# Patient Record
Sex: FEMALE | Race: WHITE | NOT HISPANIC OR LATINO | URBAN - METROPOLITAN AREA
[De-identification: names, ages, dates, MRNs, and addresses within clinical notes are randomized per-mention and may not be internally consistent; named-entity substitution may affect disease eponyms.]

---

## 2017-10-25 ENCOUNTER — OUTPATIENT (OUTPATIENT)
Dept: OUTPATIENT SERVICES | Facility: HOSPITAL | Age: 32
LOS: 1 days | Discharge: HOME | End: 2017-10-25
Payer: SELF-PAY

## 2017-10-25 DIAGNOSIS — C85.12 UNSPECIFIED B-CELL LYMPHOMA, INTRATHORACIC LYMPH NODES: ICD-10-CM

## 2017-10-30 ENCOUNTER — RESULT REVIEW (OUTPATIENT)
Age: 32
End: 2017-10-30

## 2017-10-30 ENCOUNTER — APPOINTMENT (OUTPATIENT)
Dept: HEMATOLOGY ONCOLOGY | Facility: CLINIC | Age: 32
End: 2017-10-30
Payer: COMMERCIAL

## 2017-10-30 ENCOUNTER — LABORATORY RESULT (OUTPATIENT)
Age: 32
End: 2017-10-30

## 2017-10-30 ENCOUNTER — OUTPATIENT (OUTPATIENT)
Dept: OUTPATIENT SERVICES | Facility: HOSPITAL | Age: 32
LOS: 1 days | End: 2017-10-30
Payer: COMMERCIAL

## 2017-10-30 VITALS
HEART RATE: 78 BPM | WEIGHT: 152.56 LBS | SYSTOLIC BLOOD PRESSURE: 100 MMHG | TEMPERATURE: 97.8 F | OXYGEN SATURATION: 100 % | DIASTOLIC BLOOD PRESSURE: 66 MMHG | BODY MASS INDEX: 26.05 KG/M2 | HEIGHT: 64.37 IN | RESPIRATION RATE: 16 BRPM

## 2017-10-30 DIAGNOSIS — Z78.9 OTHER SPECIFIED HEALTH STATUS: ICD-10-CM

## 2017-10-30 DIAGNOSIS — Z80.3 FAMILY HISTORY OF MALIGNANT NEOPLASM OF BREAST: ICD-10-CM

## 2017-10-30 DIAGNOSIS — C85.12 UNSPECIFIED B-CELL LYMPHOMA, INTRATHORACIC LYMPH NODES: ICD-10-CM

## 2017-10-30 LAB
EOSINOPHIL # BLD AUTO: 0.1 K/UL — SIGNIFICANT CHANGE UP (ref 0–0.5)
EOSINOPHIL NFR BLD AUTO: 1 % — SIGNIFICANT CHANGE UP (ref 0–6)
HCT VFR BLD CALC: 34.6 % — SIGNIFICANT CHANGE UP (ref 34.5–45)
HGB BLD-MCNC: 12.2 G/DL — SIGNIFICANT CHANGE UP (ref 11.5–15.5)
LYMPHOCYTES # BLD AUTO: 53 % — HIGH (ref 13–44)
LYMPHOCYTES # BLD AUTO: 7.3 K/UL — HIGH (ref 1–3.3)
MCHC RBC-ENTMCNC: 28.6 PG — SIGNIFICANT CHANGE UP (ref 27–34)
MCHC RBC-ENTMCNC: 35.4 G/DL — SIGNIFICANT CHANGE UP (ref 32–36)
MCV RBC AUTO: 80.8 FL — SIGNIFICANT CHANGE UP (ref 80–100)
MONOCYTES # BLD AUTO: 0.3 K/UL — SIGNIFICANT CHANGE UP (ref 0–0.9)
MONOCYTES NFR BLD AUTO: 9 % — SIGNIFICANT CHANGE UP (ref 2–14)
NEUTROPHILS # BLD AUTO: 3.5 K/UL — SIGNIFICANT CHANGE UP (ref 1.8–7.4)
NEUTROPHILS NFR BLD AUTO: 37 % — LOW (ref 43–77)
PLAT MORPH BLD: NORMAL — SIGNIFICANT CHANGE UP
PLATELET # BLD AUTO: 156 K/UL — SIGNIFICANT CHANGE UP (ref 150–400)
RBC # BLD: 4.28 M/UL — SIGNIFICANT CHANGE UP (ref 3.8–5.2)
RBC # FLD: 11.5 % — SIGNIFICANT CHANGE UP (ref 10.3–14.5)
RBC BLD AUTO: SIGNIFICANT CHANGE UP
WBC # BLD: 11.3 K/UL — HIGH (ref 3.8–10.5)
WBC # FLD AUTO: 11.3 K/UL — HIGH (ref 3.8–10.5)

## 2017-10-30 PROCEDURE — 88185 FLOWCYTOMETRY/TC ADD-ON: CPT

## 2017-10-30 PROCEDURE — 99244 OFF/OP CNSLTJ NEW/EST MOD 40: CPT

## 2017-10-30 PROCEDURE — 88189 FLOWCYTOMETRY/READ 16 & >: CPT

## 2017-10-30 PROCEDURE — 88184 FLOWCYTOMETRY/ TC 1 MARKER: CPT

## 2017-10-31 DIAGNOSIS — C91.10 CHRONIC LYMPHOCYTIC LEUKEMIA OF B-CELL TYPE NOT HAVING ACHIEVED REMISSION: ICD-10-CM

## 2017-11-01 ENCOUNTER — RESULT REVIEW (OUTPATIENT)
Age: 32
End: 2017-11-01

## 2017-11-01 PROCEDURE — 88321 CONSLTJ&REPRT SLD PREP ELSWR: CPT

## 2017-11-02 LAB — HEMATOPATHOLOGY REPORT: SIGNIFICANT CHANGE UP

## 2017-11-03 LAB — TM INTERPRETATION: SIGNIFICANT CHANGE UP

## 2018-01-02 ENCOUNTER — OUTPATIENT (OUTPATIENT)
Dept: OUTPATIENT SERVICES | Facility: HOSPITAL | Age: 33
LOS: 1 days | Discharge: HOME | End: 2018-01-02

## 2018-01-02 DIAGNOSIS — C91.10 CHRONIC LYMPHOCYTIC LEUKEMIA OF B-CELL TYPE NOT HAVING ACHIEVED REMISSION: ICD-10-CM

## 2018-01-18 ENCOUNTER — FORM ENCOUNTER (OUTPATIENT)
Age: 33
End: 2018-01-18

## 2018-01-19 ENCOUNTER — RESULT REVIEW (OUTPATIENT)
Age: 33
End: 2018-01-19

## 2018-01-19 ENCOUNTER — APPOINTMENT (OUTPATIENT)
Dept: RADIOLOGY | Facility: IMAGING CENTER | Age: 33
End: 2018-01-19
Payer: COMMERCIAL

## 2018-01-19 ENCOUNTER — APPOINTMENT (OUTPATIENT)
Dept: HEMATOLOGY ONCOLOGY | Facility: CLINIC | Age: 33
End: 2018-01-19
Payer: COMMERCIAL

## 2018-01-19 ENCOUNTER — OUTPATIENT (OUTPATIENT)
Dept: OUTPATIENT SERVICES | Facility: HOSPITAL | Age: 33
LOS: 1 days | End: 2018-01-19
Payer: COMMERCIAL

## 2018-01-19 VITALS
RESPIRATION RATE: 16 BRPM | SYSTOLIC BLOOD PRESSURE: 108 MMHG | WEIGHT: 156.53 LBS | TEMPERATURE: 98.6 F | HEART RATE: 72 BPM | DIASTOLIC BLOOD PRESSURE: 72 MMHG | BODY MASS INDEX: 26.56 KG/M2 | OXYGEN SATURATION: 100 %

## 2018-01-19 DIAGNOSIS — C91.10 CHRONIC LYMPHOCYTIC LEUKEMIA OF B-CELL TYPE NOT HAVING ACHIEVED REMISSION: ICD-10-CM

## 2018-01-19 LAB
BASOPHILS # BLD AUTO: 0.2 K/UL — SIGNIFICANT CHANGE UP (ref 0–0.2)
EOSINOPHIL # BLD AUTO: 0.1 K/UL — SIGNIFICANT CHANGE UP (ref 0–0.5)
EOSINOPHIL NFR BLD AUTO: 1 % — SIGNIFICANT CHANGE UP (ref 0–6)
HCT VFR BLD CALC: 32.4 % — LOW (ref 34.5–45)
HGB BLD-MCNC: 11.4 G/DL — LOW (ref 11.5–15.5)
LYMPHOCYTES # BLD AUTO: 57 % — HIGH (ref 13–44)
LYMPHOCYTES # BLD AUTO: 6.5 K/UL — HIGH (ref 1–3.3)
MCHC RBC-ENTMCNC: 28.3 PG — SIGNIFICANT CHANGE UP (ref 27–34)
MCHC RBC-ENTMCNC: 35.2 G/DL — SIGNIFICANT CHANGE UP (ref 32–36)
MCV RBC AUTO: 80.4 FL — SIGNIFICANT CHANGE UP (ref 80–100)
MONOCYTES # BLD AUTO: 0.4 K/UL — SIGNIFICANT CHANGE UP (ref 0–0.9)
MONOCYTES NFR BLD AUTO: 6 % — SIGNIFICANT CHANGE UP (ref 2–14)
NEUTROPHILS # BLD AUTO: 2.7 K/UL — SIGNIFICANT CHANGE UP (ref 1.8–7.4)
NEUTROPHILS NFR BLD AUTO: 36 % — LOW (ref 43–77)
PLAT MORPH BLD: NORMAL — SIGNIFICANT CHANGE UP
PLATELET # BLD AUTO: 159 K/UL — SIGNIFICANT CHANGE UP (ref 150–400)
RBC # BLD: 4.03 M/UL — SIGNIFICANT CHANGE UP (ref 3.8–5.2)
RBC # FLD: 11.4 % — SIGNIFICANT CHANGE UP (ref 10.3–14.5)
RBC BLD AUTO: SIGNIFICANT CHANGE UP
WBC # BLD: 9.9 K/UL — SIGNIFICANT CHANGE UP (ref 3.8–10.5)
WBC # FLD AUTO: 9.9 K/UL — SIGNIFICANT CHANGE UP (ref 3.8–10.5)

## 2018-01-19 PROCEDURE — 99214 OFFICE O/P EST MOD 30 MIN: CPT

## 2018-01-19 PROCEDURE — 71046 X-RAY EXAM CHEST 2 VIEWS: CPT

## 2018-01-19 PROCEDURE — 71046 X-RAY EXAM CHEST 2 VIEWS: CPT | Mod: 26

## 2018-02-12 ENCOUNTER — MEDICATION RENEWAL (OUTPATIENT)
Age: 33
End: 2018-02-12

## 2018-04-30 ENCOUNTER — OUTPATIENT (OUTPATIENT)
Dept: OUTPATIENT SERVICES | Facility: HOSPITAL | Age: 33
LOS: 1 days | Discharge: HOME | End: 2018-04-30

## 2018-04-30 DIAGNOSIS — C91.10 CHRONIC LYMPHOCYTIC LEUKEMIA OF B-CELL TYPE NOT HAVING ACHIEVED REMISSION: ICD-10-CM

## 2018-05-02 ENCOUNTER — RESULT REVIEW (OUTPATIENT)
Age: 33
End: 2018-05-02

## 2018-05-02 ENCOUNTER — APPOINTMENT (OUTPATIENT)
Dept: HEMATOLOGY ONCOLOGY | Facility: CLINIC | Age: 33
End: 2018-05-02
Payer: COMMERCIAL

## 2018-05-02 VITALS
SYSTOLIC BLOOD PRESSURE: 108 MMHG | OXYGEN SATURATION: 100 % | DIASTOLIC BLOOD PRESSURE: 68 MMHG | HEART RATE: 74 BPM | WEIGHT: 151.44 LBS | TEMPERATURE: 97.8 F | RESPIRATION RATE: 16 BRPM | BODY MASS INDEX: 25.7 KG/M2

## 2018-05-02 LAB
BASOPHILS # BLD AUTO: 0 K/UL — SIGNIFICANT CHANGE UP (ref 0–0.2)
EOSINOPHIL # BLD AUTO: 0.1 K/UL — SIGNIFICANT CHANGE UP (ref 0–0.5)
HCT VFR BLD CALC: 37.8 % — SIGNIFICANT CHANGE UP (ref 34.5–45)
HGB BLD-MCNC: 12.9 G/DL — SIGNIFICANT CHANGE UP (ref 11.5–15.5)
LYMPHOCYTES # BLD AUTO: 60 % — HIGH (ref 13–44)
LYMPHOCYTES # BLD AUTO: 7.8 K/UL — HIGH (ref 1–3.3)
MCHC RBC-ENTMCNC: 27.2 PG — SIGNIFICANT CHANGE UP (ref 27–34)
MCHC RBC-ENTMCNC: 34.1 G/DL — SIGNIFICANT CHANGE UP (ref 32–36)
MCV RBC AUTO: 79.9 FL — LOW (ref 80–100)
MONOCYTES # BLD AUTO: 0.5 K/UL — SIGNIFICANT CHANGE UP (ref 0–0.9)
MONOCYTES NFR BLD AUTO: 4 % — SIGNIFICANT CHANGE UP (ref 2–14)
NEUTROPHILS # BLD AUTO: 4.4 K/UL — SIGNIFICANT CHANGE UP (ref 1.8–7.4)
NEUTROPHILS NFR BLD AUTO: 36 % — LOW (ref 43–77)
PLAT MORPH BLD: NORMAL — SIGNIFICANT CHANGE UP
PLATELET # BLD AUTO: 177 K/UL — SIGNIFICANT CHANGE UP (ref 150–400)
RBC # BLD: 4.73 M/UL — SIGNIFICANT CHANGE UP (ref 3.8–5.2)
RBC # FLD: 12 % — SIGNIFICANT CHANGE UP (ref 10.3–14.5)
RBC BLD AUTO: SIGNIFICANT CHANGE UP
WBC # BLD: 12.8 K/UL — HIGH (ref 3.8–10.5)
WBC # FLD AUTO: 12.8 K/UL — HIGH (ref 3.8–10.5)

## 2018-05-02 PROCEDURE — 99213 OFFICE O/P EST LOW 20 MIN: CPT

## 2018-09-07 ENCOUNTER — RESULT REVIEW (OUTPATIENT)
Age: 33
End: 2018-09-07

## 2018-11-05 ENCOUNTER — OUTPATIENT (OUTPATIENT)
Dept: OUTPATIENT SERVICES | Facility: HOSPITAL | Age: 33
LOS: 1 days | Discharge: HOME | End: 2018-11-05

## 2018-11-05 DIAGNOSIS — C91.10 CHRONIC LYMPHOCYTIC LEUKEMIA OF B-CELL TYPE NOT HAVING ACHIEVED REMISSION: ICD-10-CM

## 2018-11-14 ENCOUNTER — RESULT REVIEW (OUTPATIENT)
Age: 33
End: 2018-11-14

## 2018-11-14 ENCOUNTER — APPOINTMENT (OUTPATIENT)
Dept: HEMATOLOGY ONCOLOGY | Facility: CLINIC | Age: 33
End: 2018-11-14
Payer: COMMERCIAL

## 2018-11-14 VITALS
WEIGHT: 158.29 LBS | RESPIRATION RATE: 16 BRPM | BODY MASS INDEX: 26.86 KG/M2 | TEMPERATURE: 98.1 F | OXYGEN SATURATION: 100 % | HEART RATE: 69 BPM | SYSTOLIC BLOOD PRESSURE: 125 MMHG | DIASTOLIC BLOOD PRESSURE: 75 MMHG

## 2018-11-14 LAB
BASOPHILS # BLD AUTO: 0 K/UL — SIGNIFICANT CHANGE UP (ref 0–0.2)
EOSINOPHIL # BLD AUTO: 0.1 K/UL — SIGNIFICANT CHANGE UP (ref 0–0.5)
EOSINOPHIL NFR BLD AUTO: 1 % — SIGNIFICANT CHANGE UP (ref 0–6)
HCT VFR BLD CALC: 36.6 % — SIGNIFICANT CHANGE UP (ref 34.5–45)
HGB BLD-MCNC: 12.2 G/DL — SIGNIFICANT CHANGE UP (ref 11.5–15.5)
LYMPHOCYTES # BLD AUTO: 10.7 K/UL — HIGH (ref 1–3.3)
LYMPHOCYTES # BLD AUTO: 79 % — HIGH (ref 13–44)
MCHC RBC-ENTMCNC: 26.5 PG — LOW (ref 27–34)
MCHC RBC-ENTMCNC: 33.4 G/DL — SIGNIFICANT CHANGE UP (ref 32–36)
MCV RBC AUTO: 79.5 FL — LOW (ref 80–100)
MONOCYTES # BLD AUTO: 0.5 K/UL — SIGNIFICANT CHANGE UP (ref 0–0.9)
MONOCYTES NFR BLD AUTO: 1 % — LOW (ref 2–14)
NEUTROPHILS # BLD AUTO: 4.9 K/UL — SIGNIFICANT CHANGE UP (ref 1.8–7.4)
NEUTROPHILS NFR BLD AUTO: 19 % — LOW (ref 43–77)
PLAT MORPH BLD: NORMAL — SIGNIFICANT CHANGE UP
PLATELET # BLD AUTO: 186 K/UL — SIGNIFICANT CHANGE UP (ref 150–400)
RBC # BLD: 4.61 M/UL — SIGNIFICANT CHANGE UP (ref 3.8–5.2)
RBC # FLD: 11.5 % — SIGNIFICANT CHANGE UP (ref 10.3–14.5)
RBC BLD AUTO: SIGNIFICANT CHANGE UP
SMUDGE CELLS # BLD: PRESENT — SIGNIFICANT CHANGE UP
WBC # BLD: 16.2 K/UL — HIGH (ref 3.8–10.5)
WBC # FLD AUTO: 16.2 K/UL — HIGH (ref 3.8–10.5)

## 2018-11-14 PROCEDURE — 99213 OFFICE O/P EST LOW 20 MIN: CPT

## 2018-11-14 RX ORDER — LEVOFLOXACIN 500 MG/1
500 TABLET, FILM COATED ORAL DAILY
Qty: 10 | Refills: 0 | Status: DISCONTINUED | COMMUNITY
Start: 2018-01-19 | End: 2018-11-14

## 2018-11-14 RX ORDER — AMOXICILLIN AND CLAVULANATE POTASSIUM 875; 125 MG/1; MG/1
875-125 TABLET, COATED ORAL
Qty: 14 | Refills: 1 | Status: DISCONTINUED | COMMUNITY
Start: 2018-02-12 | End: 2018-11-14

## 2018-11-14 RX ORDER — BENZONATATE 100 MG/1
100 CAPSULE ORAL 3 TIMES DAILY
Qty: 30 | Refills: 0 | Status: DISCONTINUED | COMMUNITY
Start: 2018-01-19 | End: 2018-11-14

## 2018-11-14 RX ORDER — HYDROCODONE BITARTRATE AND HOMATROPINE METHYLBROMIDE 5; 1.5 MG/5ML; MG/5ML
5-1.5 SYRUP ORAL
Qty: 1 | Refills: 0 | Status: DISCONTINUED | COMMUNITY
Start: 2018-02-12 | End: 2018-11-14

## 2018-11-14 NOTE — ASSESSMENT
[FreeTextEntry1] : This is a 33 year old female with lymphocytosis, bone marrow biopsy is consistent with chronic lymphocytic leukemia- stage 0.\par PET scan 9/2017 did not show any lymphadenopathy or splenomegaly.  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 \par Her CBC is stable and she has no evidence of splenomegaly or lymphadenopathy.  \par Check her CMP, LDH.\par Recommend surgical evaluation for a potential cholecystectomy.    \par Follow up every 6 months.

## 2018-11-14 NOTE — HISTORY OF PRESENT ILLNESS
[de-identified] : CLL- stage 0.  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 \par \par She was found to have elevated white blood cell count on routine blood work.  CBC on 7/1- WBC 11.2 ALC 7750 Hg 12.0 Plt 192.  She was seen by Dr. Subha Charles.   Peripheral blood flow cytometry was sent 8/24/17- revealed a population of B cells, positive for CD19, CD20,negative for CD5 and CD10 with no light chain restriction seen.  Labs sent reveal a normal SPEP/LAINE, k/l ration 1.71, B2 microglobulin 1.41, IgG 889, IgM 87,ESR<2.  She underwent a bone marrow biopsy on 9/26 which revealed a normocellular marrow, trilineage hematopoiesis, minimal involvement with CLL (10-15%)- IHC positive CD19, CD20, CD5, CD23.  CLL FISH revealed deletion 13, no evidence of del 11 or 17.  IGVH is mutated, CD38 is not expressed.  For some reason FISH for bcr-abl was sent- negative along with CALR which was also normal.  She had a PET/CT completed on 9/7 that revealed a 5 mm nodule in her LLL, otherwise no lymphadenopathy or splenomegaly.  She denies any previous medical history. \par \par Labs from 7/31/17 show Monos 0.8, EOS 0.4%, Lymph 69.4%, Hgb 12.0, Polys 29.0, HCT 38.1, WBC 11.2, Plt 192K. Labs from 6/19/16 show Monos 3.5, EOS 0.5%, Lymph 56.7%, Hgb 12.0, Polys 38.9, HCT 36.2, WBC 11.3, Plt 192K, Cr 0.7. \par \par \par \par \par \par  [de-identified] : Patient presents for a follow up appointment. She is doing well, denies any new infections.  She has been contemplating having a cholecystectomy due to large gallstones, she is not symptomatic but a recent ultrasound completed 2 months ago revealed large stones.  She has been dealing with gallstones for the last 8 years since the birth of her son.  She denies any fatigue, fever/chills, no night sweats, no weight loss.  She denies any chest pain, no SOB, no abdominal c/o, no n/v/d.\par

## 2018-11-14 NOTE — ADDENDUM
[FreeTextEntry1] :  Documented by Beatriz Chang acting as a scribe for Dr. Franco on 11/14/18.\par \par All medical record entries made by the Scribe were at my, Dr. Franco's, direction and\par personally dictated by me on 11/14/18 I have reviewed the chart and agree that the record\par accurately reflects my personal performance of the history, physical exam, procedure and imaging.

## 2019-03-14 LAB
ALBUMIN SERPL ELPH-MCNC: 4.5 G/DL
ALBUMIN SERPL ELPH-MCNC: 4.8 G/DL
ALBUMIN SERPL ELPH-MCNC: 4.9 G/DL
ALP BLD-CCNC: 41 U/L
ALP BLD-CCNC: 42 U/L
ALP BLD-CCNC: 42 U/L
ALT SERPL-CCNC: 20 U/L
ALT SERPL-CCNC: 23 U/L
ALT SERPL-CCNC: 28 U/L
ANION GAP SERPL CALC-SCNC: 12 MMOL/L
ANION GAP SERPL CALC-SCNC: 13 MMOL/L
ANION GAP SERPL CALC-SCNC: 15 MMOL/L
AST SERPL-CCNC: 13 U/L
AST SERPL-CCNC: 18 U/L
AST SERPL-CCNC: 19 U/L
BILIRUB SERPL-MCNC: 0.2 MG/DL
BILIRUB SERPL-MCNC: 0.2 MG/DL
BILIRUB SERPL-MCNC: <0.2 MG/DL
BUN SERPL-MCNC: 11 MG/DL
BUN SERPL-MCNC: 11 MG/DL
BUN SERPL-MCNC: 15 MG/DL
CALCIUM SERPL-MCNC: 10.2 MG/DL
CALCIUM SERPL-MCNC: 9.3 MG/DL
CALCIUM SERPL-MCNC: 9.8 MG/DL
CHLORIDE SERPL-SCNC: 101 MMOL/L
CHLORIDE SERPL-SCNC: 103 MMOL/L
CHLORIDE SERPL-SCNC: 103 MMOL/L
CO2 SERPL-SCNC: 25 MMOL/L
CO2 SERPL-SCNC: 25 MMOL/L
CO2 SERPL-SCNC: 26 MMOL/L
CREAT SERPL-MCNC: 0.64 MG/DL
CREAT SERPL-MCNC: 0.77 MG/DL
CREAT SERPL-MCNC: 0.79 MG/DL
GLUCOSE SERPL-MCNC: 100 MG/DL
GLUCOSE SERPL-MCNC: 106 MG/DL
GLUCOSE SERPL-MCNC: 87 MG/DL
LDH SERPL-CCNC: 130 U/L
LDH SERPL-CCNC: 132 U/L
LDH SERPL-CCNC: 146 U/L
POTASSIUM SERPL-SCNC: 4.1 MMOL/L
POTASSIUM SERPL-SCNC: 4.3 MMOL/L
POTASSIUM SERPL-SCNC: 4.8 MMOL/L
PROT SERPL-MCNC: 6.9 G/DL
PROT SERPL-MCNC: 7.1 G/DL
PROT SERPL-MCNC: 7.4 G/DL
SODIUM SERPL-SCNC: 139 MMOL/L
SODIUM SERPL-SCNC: 141 MMOL/L
SODIUM SERPL-SCNC: 143 MMOL/L

## 2019-04-04 ENCOUNTER — OUTPATIENT (OUTPATIENT)
Dept: OUTPATIENT SERVICES | Facility: HOSPITAL | Age: 34
LOS: 1 days | Discharge: HOME | End: 2019-04-04

## 2019-04-04 DIAGNOSIS — C91.10 CHRONIC LYMPHOCYTIC LEUKEMIA OF B-CELL TYPE NOT HAVING ACHIEVED REMISSION: ICD-10-CM

## 2019-04-12 ENCOUNTER — RESULT REVIEW (OUTPATIENT)
Age: 34
End: 2019-04-12

## 2019-04-12 ENCOUNTER — APPOINTMENT (OUTPATIENT)
Dept: HEMATOLOGY ONCOLOGY | Facility: CLINIC | Age: 34
End: 2019-04-12
Payer: COMMERCIAL

## 2019-04-12 VITALS
HEART RATE: 69 BPM | BODY MASS INDEX: 26.19 KG/M2 | WEIGHT: 154.32 LBS | TEMPERATURE: 98.2 F | OXYGEN SATURATION: 100 % | DIASTOLIC BLOOD PRESSURE: 73 MMHG | RESPIRATION RATE: 16 BRPM | SYSTOLIC BLOOD PRESSURE: 115 MMHG

## 2019-04-12 DIAGNOSIS — M79.603 PAIN IN ARM, UNSPECIFIED: ICD-10-CM

## 2019-04-12 LAB
BASOPHILS # BLD AUTO: 0.1 K/UL — SIGNIFICANT CHANGE UP (ref 0–0.2)
EOSINOPHIL # BLD AUTO: 0.1 K/UL — SIGNIFICANT CHANGE UP (ref 0–0.5)
HCT VFR BLD CALC: 35.2 % — SIGNIFICANT CHANGE UP (ref 34.5–45)
HGB BLD-MCNC: 11.7 G/DL — SIGNIFICANT CHANGE UP (ref 11.5–15.5)
LYMPHOCYTES # BLD AUTO: 70 % — HIGH (ref 13–44)
LYMPHOCYTES # BLD AUTO: 9.7 K/UL — HIGH (ref 1–3.3)
MCHC RBC-ENTMCNC: 26.8 PG — LOW (ref 27–34)
MCHC RBC-ENTMCNC: 33.4 G/DL — SIGNIFICANT CHANGE UP (ref 32–36)
MCV RBC AUTO: 80.2 FL — SIGNIFICANT CHANGE UP (ref 80–100)
MONOCYTES # BLD AUTO: 0.4 K/UL — SIGNIFICANT CHANGE UP (ref 0–0.9)
MONOCYTES NFR BLD AUTO: 4 % — SIGNIFICANT CHANGE UP (ref 2–14)
NEUTROPHILS # BLD AUTO: 4.4 K/UL — SIGNIFICANT CHANGE UP (ref 1.8–7.4)
NEUTROPHILS NFR BLD AUTO: 26 % — LOW (ref 43–77)
PLAT MORPH BLD: NORMAL — SIGNIFICANT CHANGE UP
PLATELET # BLD AUTO: 182 K/UL — SIGNIFICANT CHANGE UP (ref 150–400)
RBC # BLD: 4.38 M/UL — SIGNIFICANT CHANGE UP (ref 3.8–5.2)
RBC # FLD: 11.4 % — SIGNIFICANT CHANGE UP (ref 10.3–14.5)
RBC BLD AUTO: SIGNIFICANT CHANGE UP
WBC # BLD: 14.7 K/UL — HIGH (ref 3.8–10.5)
WBC # FLD AUTO: 14.7 K/UL — HIGH (ref 3.8–10.5)

## 2019-04-12 PROCEDURE — 99214 OFFICE O/P EST MOD 30 MIN: CPT

## 2019-04-14 ENCOUNTER — FORM ENCOUNTER (OUTPATIENT)
Age: 34
End: 2019-04-14

## 2019-04-15 ENCOUNTER — APPOINTMENT (OUTPATIENT)
Dept: RADIOLOGY | Facility: IMAGING CENTER | Age: 34
End: 2019-04-15
Payer: COMMERCIAL

## 2019-04-15 ENCOUNTER — OUTPATIENT (OUTPATIENT)
Dept: OUTPATIENT SERVICES | Facility: HOSPITAL | Age: 34
LOS: 1 days | End: 2019-04-15
Payer: COMMERCIAL

## 2019-04-15 ENCOUNTER — APPOINTMENT (OUTPATIENT)
Dept: RADIOLOGY | Facility: IMAGING CENTER | Age: 34
End: 2019-04-15

## 2019-04-15 ENCOUNTER — FORM ENCOUNTER (OUTPATIENT)
Age: 34
End: 2019-04-15

## 2019-04-15 DIAGNOSIS — M79.603 PAIN IN ARM, UNSPECIFIED: ICD-10-CM

## 2019-04-15 PROCEDURE — 73080 X-RAY EXAM OF ELBOW: CPT

## 2019-04-15 PROCEDURE — 73110 X-RAY EXAM OF WRIST: CPT | Mod: 26,RT

## 2019-04-15 PROCEDURE — 73030 X-RAY EXAM OF SHOULDER: CPT

## 2019-04-15 PROCEDURE — 73060 X-RAY EXAM OF HUMERUS: CPT

## 2019-04-15 PROCEDURE — 73110 X-RAY EXAM OF WRIST: CPT

## 2019-04-17 ENCOUNTER — MESSAGE (OUTPATIENT)
Age: 34
End: 2019-04-17

## 2019-05-20 ENCOUNTER — APPOINTMENT (OUTPATIENT)
Dept: OTOLARYNGOLOGY | Facility: CLINIC | Age: 34
End: 2019-05-20

## 2019-06-03 NOTE — HISTORY OF PRESENT ILLNESS
[de-identified] : CLL- stage 0.  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 \par \par She was found to have elevated white blood cell count on routine blood work.  CBC on 7/1- WBC 11.2 ALC 7750 Hg 12.0 Plt 192.  She was seen by Dr. Subha Chalres.   Peripheral blood flow cytometry was sent 8/24/17- revealed a population of B cells, positive for CD19, CD20,negative for CD5 and CD10 with no light chain restriction seen.  Labs sent reveal a normal SPEP/LAINE, k/l ration 1.71, B2 microglobulin 1.41, IgG 889, IgM 87,ESR<2.  She underwent a bone marrow biopsy on 9/26 which revealed a normocellular marrow, trilineage hematopoiesis, minimal involvement with CLL (10-15%)- IHC positive CD19, CD20, CD5, CD23.  CLL FISH revealed deletion 13, no evidence of del 11 or 17.  IGVH is mutated, CD38 is not expressed.  For some reason FISH for bcr-abl was sent- negative along with CALR which was also normal.  She had a PET/CT completed on 9/7 that revealed a 5 mm nodule in her LLL, otherwise no lymphadenopathy or splenomegaly.  She denies any previous medical history. \par \par Labs from 7/31/17 show Monos 0.8, EOS 0.4%, Lymph 69.4%, Hgb 12.0, Polys 29.0, HCT 38.1, WBC 11.2, Plt 192K. Labs from 6/19/16 show Monos 3.5, EOS 0.5%, Lymph 56.7%, Hgb 12.0, Polys 38.9, HCT 36.2, WBC 11.3, Plt 192K, Cr 0.7. \par \par \par \par \par \par  [de-identified] : Patient presents for a follow up appointment. She is doing well but reports fatigue and a shooting pain in her right arm - has trouble grabbing things. States that’s it started about 5 months ago. She continues to complain of a cough, clear phlegm and was given Zpack by her PCP.  She denies any fatigue, no fever/chills, no abdominal pain, no night sweats, no weight loss, no n/v/d. \par

## 2019-06-03 NOTE — PHYSICAL EXAM
[Fully active, able to carry on all pre-disease performance without restriction] : Status 0 - Fully active, able to carry on all pre-disease performance without restriction [Normal] : affect appropriate [de-identified] : Full ROM in UE

## 2019-06-03 NOTE — ASSESSMENT
[FreeTextEntry1] : This is a 33 year old female with lymphocytosis, bone marrow biopsy is consistent with chronic lymphocytic leukemia- stage 0.\par PET scan 9/2017 did not show any lymphadenopathy or splenomegaly.  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 \par Her CBC is stable and she has no evidence of splenomegaly or lymphadenopathy.  \par Check her CMP, LDH, TSH\par Check iron studies, begin slow Fe every other day.  Bowel regimen as needed.\par Plan for X-Ray of right shoulder/elbow\par She will follow up with her PCP and will call if her cough does not improve.\par Follow up in 3-4 months.

## 2019-06-03 NOTE — ADDENDUM
[FreeTextEntry1] : Documented by Beatriz Chang acting as a scribe for Dr. Franco on 4/12/2019\par \par All medical record entries made by the Scribe were at my, Dr. Franco's, direction and\par personally dictated by me on 4/12/2019 I have reviewed the chart and agree that the record\par accurately reflects my personal performance of the history, physical exam, procedure and imaging.

## 2019-06-04 ENCOUNTER — APPOINTMENT (OUTPATIENT)
Dept: PULMONOLOGY | Facility: CLINIC | Age: 34
End: 2019-06-04
Payer: COMMERCIAL

## 2019-06-04 VITALS
OXYGEN SATURATION: 98 % | HEART RATE: 87 BPM | RESPIRATION RATE: 15 BRPM | DIASTOLIC BLOOD PRESSURE: 80 MMHG | TEMPERATURE: 98.6 F | WEIGHT: 149 LBS | SYSTOLIC BLOOD PRESSURE: 128 MMHG | HEIGHT: 64 IN | BODY MASS INDEX: 25.44 KG/M2

## 2019-06-04 DIAGNOSIS — R05 COUGH: ICD-10-CM

## 2019-06-04 PROCEDURE — 99203 OFFICE O/P NEW LOW 30 MIN: CPT

## 2019-06-04 NOTE — CONSULT LETTER
[Dear  ___] : Dear  [unfilled], [Consult Letter:] : I had the pleasure of evaluating your patient, [unfilled]. [FreeTextEntry2] : Bethany Michael MD

## 2019-06-04 NOTE — REVIEW OF SYSTEMS
[Cough] : cough [Headache] : headache [Negative] : Dermatologic [Dry Eyes] : no dryness of the eyes [Nasal Congestion] : no nasal congestion [Eye Irritation] : no ~T irritation of the eyes [Ear Disturbance] : no ear disturbance [Epistaxis] : no nosebleeds [Sore Throat] : no sore throat [Postnasal Drip] : no postnasal drip [Hemoptysis] : no hemoptysis [Sputum] : not coughing up ~M sputum [Dry Mouth] : no dry mouth [Chest Tightness] : no chest tightness [Dyspnea] : no dyspnea [Pleuritic Pain] : no pleuritic pain [Frequent URIs] : no frequent upper respiratory infections [Wheezing] : no wheezing

## 2019-06-04 NOTE — DISCUSSION/SUMMARY
[FreeTextEntry1] : 34 yo woman with CLL, no on treatment presenting with 2 months of dry cough, starting with URI but has not resolved, treated with Z pack and benadryl with no improvement.  \par \par Cough: likely 2/2 upper airway cough syndrome.  Will start Flonase BID for 4 week, if not improving in 2 weeks, will schedule PFTs and Repeat Chest CT, though underlying airway or lung disease is less likely at this point. \par \par

## 2019-06-04 NOTE — PHYSICAL EXAM
[General Appearance - Well Developed] : well developed [General Appearance - Well Nourished] : well nourished [Well Groomed] : well groomed [Normal Appearance] : normal appearance [Normal Conjunctiva] : the conjunctiva exhibited no abnormalities [General Appearance - In No Acute Distress] : no acute distress [No Deformities] : no deformities [Normal Oropharynx] : normal oropharynx [Eyelids - No Xanthelasma] : the eyelids demonstrated no xanthelasmas [Jugular Venous Distention Increased] : there was no jugular-venous distention [Neck Cervical Mass (___cm)] : no neck mass was observed [Neck Appearance] : the appearance of the neck was normal [Thyroid Nodule] : there were no palpable thyroid nodules [Thyroid Diffuse Enlargement] : the thyroid was not enlarged [Murmurs] : no murmurs present [Heart Sounds] : normal S1 and S2 [Heart Rate And Rhythm] : heart rate and rhythm were normal [Exaggerated Use Of Accessory Muscles For Inspiration] : no accessory muscle use [Respiration, Rhythm And Depth] : normal respiratory rhythm and effort [Abdomen Soft] : soft [Auscultation Breath Sounds / Voice Sounds] : lungs were clear to auscultation bilaterally [Abdomen Mass (___ Cm)] : no abdominal mass palpated [Abdomen Tenderness] : non-tender [Abnormal Walk] : normal gait [Nail Clubbing] : no clubbing of the fingernails [Gait - Sufficient For Exercise Testing] : the gait was sufficient for exercise testing [Cyanosis, Localized] : no localized cyanosis [Petechial Hemorrhages (___cm)] : no petechial hemorrhages [Skin Color & Pigmentation] : normal skin color and pigmentation [] : no rash [Skin Turgor] : normal skin turgor [Sensation] : the sensory exam was normal to light touch and pinprick [No Focal Deficits] : no focal deficits [Deep Tendon Reflexes (DTR)] : deep tendon reflexes were 2+ and symmetric [Oriented To Time, Place, And Person] : oriented to person, place, and time [Impaired Insight] : insight and judgment were intact [Affect] : the affect was normal

## 2019-06-04 NOTE — HISTORY OF PRESENT ILLNESS
[FreeTextEntry1] : 34 yo woman with a cough of 2 month, started with itchy throat, now dry cough, tickle in the back of the throat, no fevers, no chills, coughing fits with headache.  Tried benadryl, zithromax, with maybe 1 day relief. \par Cough more during the day. No GERD, NO sour taste, no throat clearing, no sick contacts.  NO weight loss, night sweats or fatigue. Chronic had and pedal swelling. 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 \par \par She reports a recent chest Xray was negative\par meds------ none\par imaging----- PET with 5mm LL nodule in 2017\par

## 2019-06-19 ENCOUNTER — OUTPATIENT (OUTPATIENT)
Dept: OUTPATIENT SERVICES | Facility: HOSPITAL | Age: 34
LOS: 1 days | Discharge: HOME | End: 2019-06-19

## 2019-06-19 DIAGNOSIS — C91.10 CHRONIC LYMPHOCYTIC LEUKEMIA OF B-CELL TYPE NOT HAVING ACHIEVED REMISSION: ICD-10-CM

## 2019-06-20 ENCOUNTER — RESULT REVIEW (OUTPATIENT)
Age: 34
End: 2019-06-20

## 2019-06-20 ENCOUNTER — APPOINTMENT (OUTPATIENT)
Dept: HEMATOLOGY ONCOLOGY | Facility: CLINIC | Age: 34
End: 2019-06-20
Payer: COMMERCIAL

## 2019-06-20 VITALS
RESPIRATION RATE: 16 BRPM | OXYGEN SATURATION: 100 % | SYSTOLIC BLOOD PRESSURE: 120 MMHG | TEMPERATURE: 99.2 F | WEIGHT: 148 LBS | HEART RATE: 80 BPM | DIASTOLIC BLOOD PRESSURE: 69 MMHG | BODY MASS INDEX: 25.41 KG/M2

## 2019-06-20 LAB
BASOPHILS # BLD AUTO: 0.1 K/UL — SIGNIFICANT CHANGE UP (ref 0–0.2)
EOSINOPHIL # BLD AUTO: 0.1 K/UL — SIGNIFICANT CHANGE UP (ref 0–0.5)
HCT VFR BLD CALC: 35.2 % — SIGNIFICANT CHANGE UP (ref 34.5–45)
HGB BLD-MCNC: 12.1 G/DL — SIGNIFICANT CHANGE UP (ref 11.5–15.5)
LYMPHOCYTES # BLD AUTO: 11.6 K/UL — HIGH (ref 1–3.3)
LYMPHOCYTES # BLD AUTO: 63 % — HIGH (ref 13–44)
MCHC RBC-ENTMCNC: 27.4 PG — SIGNIFICANT CHANGE UP (ref 27–34)
MCHC RBC-ENTMCNC: 34.3 G/DL — SIGNIFICANT CHANGE UP (ref 32–36)
MCV RBC AUTO: 79.9 FL — LOW (ref 80–100)
MONOCYTES # BLD AUTO: 0.5 K/UL — SIGNIFICANT CHANGE UP (ref 0–0.9)
MONOCYTES NFR BLD AUTO: 5 % — SIGNIFICANT CHANGE UP (ref 2–14)
NEUTROPHILS # BLD AUTO: 4.2 K/UL — SIGNIFICANT CHANGE UP (ref 1.8–7.4)
NEUTROPHILS NFR BLD AUTO: 32 % — LOW (ref 43–77)
PLAT MORPH BLD: NORMAL — SIGNIFICANT CHANGE UP
PLATELET # BLD AUTO: 175 K/UL — SIGNIFICANT CHANGE UP (ref 150–400)
RBC # BLD: 4.4 M/UL — SIGNIFICANT CHANGE UP (ref 3.8–5.2)
RBC # FLD: 12 % — SIGNIFICANT CHANGE UP (ref 10.3–14.5)
RBC BLD AUTO: SIGNIFICANT CHANGE UP
WBC # BLD: 16.5 K/UL — HIGH (ref 3.8–10.5)
WBC # FLD AUTO: 16.5 K/UL — HIGH (ref 3.8–10.5)

## 2019-06-20 PROCEDURE — 99214 OFFICE O/P EST MOD 30 MIN: CPT

## 2019-06-20 NOTE — REVIEW OF SYSTEMS
[Fever] : no fever [Chills] : chills [Night Sweats] : night sweats [Cough] : cough [Muscle Pain] : no muscle pain [Negative] : Musculoskeletal [FreeTextEntry2] : chills and sweats for 2 weeks-day and night  [FreeTextEntry6] : chronic cough for 5 months [de-identified] : swollen R neck area, not palpable

## 2019-06-20 NOTE — ASSESSMENT
[FreeTextEntry1] : This is a 33 year old female with lymphocytosis, bone marrow biopsy is consistent with chronic lymphocytic leukemia- stage 0.\par PET scan 9/2017 did not show any lymphadenopathy or splenomegaly.  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 \par Her CBC is stable and she has no evidence of splenomegaly or lymphadenopathy.  \par Right neck fullness, swelling last few days-saw PCP who recommends CT of chest and neck. Advised to do testing with contrast.\par Taking slow Fe every other day.  Bowel regimen as needed.\par Chronic cough-will see pulmonary on 6/24.\par Follow up in 3-4 months.

## 2019-06-20 NOTE — REASON FOR VISIT
[Follow-Up Visit] : a follow-up visit for [CLL] : chronic lymphocytic leukemia [Parent] : parent [FreeTextEntry2] : Lymphocytosis

## 2019-06-20 NOTE — HISTORY OF PRESENT ILLNESS
[de-identified] : CLL- stage 0.  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 \par \par She was found to have elevated white blood cell count on routine blood work.  CBC on 7/1- WBC 11.2 ALC 7750 Hg 12.0 Plt 192.  She was seen by Dr. Subha Charles.   Peripheral blood flow cytometry was sent 8/24/17- revealed a population of B cells, positive for CD19, CD20,negative for CD5 and CD10 with no light chain restriction seen.  Labs sent reveal a normal SPEP/LAINE, k/l ration 1.71, B2 microglobulin 1.41, IgG 889, IgM 87,ESR<2.  She underwent a bone marrow biopsy on 9/26 which revealed a normocellular marrow, trilineage hematopoiesis, minimal involvement with CLL (10-15%)- IHC positive CD19, CD20, CD5, CD23.  CLL FISH revealed deletion 13, no evidence of del 11 or 17.  IGVH is mutated, CD38 is not expressed.  For some reason FISH for bcr-abl was sent- negative along with CALR which was also normal.  She had a PET/CT completed on 9/7 that revealed a 5 mm nodule in her LLL, otherwise no lymphadenopathy or splenomegaly.  She denies any previous medical history. \par \par Labs from 7/31/17 show Monos 0.8, EOS 0.4%, Lymph 69.4%, Hgb 12.0, Polys 29.0, HCT 38.1, WBC 11.2, Plt 192K. Labs from 6/19/16 show Monos 3.5, EOS 0.5%, Lymph 56.7%, Hgb 12.0, Polys 38.9, HCT 36.2, WBC 11.3, Plt 192K, Cr 0.7. \par \par \par \par \par \par  [de-identified] : Patient presents for a follow up appointment. She is doing well but reports fatigue and a shooting pain in her right arm - has trouble grabbing things. States that’s it started about 5 months ago. She continues to complain of a cough, clear phlegm and was given Zpack by her PCP.  She denies any fatigue, no fever/chills, no abdominal pain, no night sweats, no weight loss, no n/v/d. \par \par Pt presents today with c/o R neck swelling, fullness that was noted by PCP Dr Bethany Michael on Monday, also notes 2 week hx of sweats-these occur day and night.  No fevers.  Pt has had chronic cough for 5 months-has received antibiotics; is seeing pulmonologist on 6/24.\par

## 2019-06-20 NOTE — PHYSICAL EXAM
[Fully active, able to carry on all pre-disease performance without restriction] : Status 0 - Fully active, able to carry on all pre-disease performance without restriction [Normal] : affect appropriate [de-identified] : Full ROM in UE

## 2019-06-24 ENCOUNTER — APPOINTMENT (OUTPATIENT)
Dept: PULMONOLOGY | Facility: CLINIC | Age: 34
End: 2019-06-24

## 2019-07-03 ENCOUNTER — APPOINTMENT (OUTPATIENT)
Dept: CT IMAGING | Facility: IMAGING CENTER | Age: 34
End: 2019-07-03
Payer: COMMERCIAL

## 2019-07-03 ENCOUNTER — OUTPATIENT (OUTPATIENT)
Dept: OUTPATIENT SERVICES | Facility: HOSPITAL | Age: 34
LOS: 1 days | End: 2019-07-03
Payer: COMMERCIAL

## 2019-07-03 DIAGNOSIS — C91.12 CHRONIC LYMPHOCYTIC LEUKEMIA OF B-CELL TYPE IN RELAPSE: ICD-10-CM

## 2019-07-03 PROCEDURE — 70491 CT SOFT TISSUE NECK W/DYE: CPT | Mod: 26

## 2019-07-03 PROCEDURE — 71260 CT THORAX DX C+: CPT

## 2019-07-03 PROCEDURE — 71260 CT THORAX DX C+: CPT | Mod: 26

## 2019-07-03 PROCEDURE — 70491 CT SOFT TISSUE NECK W/DYE: CPT

## 2019-07-18 ENCOUNTER — OUTPATIENT (OUTPATIENT)
Dept: OUTPATIENT SERVICES | Facility: HOSPITAL | Age: 34
LOS: 1 days | Discharge: HOME | End: 2019-07-18

## 2019-07-18 DIAGNOSIS — C91.10 CHRONIC LYMPHOCYTIC LEUKEMIA OF B-CELL TYPE NOT HAVING ACHIEVED REMISSION: ICD-10-CM

## 2019-07-22 ENCOUNTER — RESULT REVIEW (OUTPATIENT)
Age: 34
End: 2019-07-22

## 2019-07-22 ENCOUNTER — APPOINTMENT (OUTPATIENT)
Dept: HEMATOLOGY ONCOLOGY | Facility: CLINIC | Age: 34
End: 2019-07-22
Payer: COMMERCIAL

## 2019-07-22 VITALS
OXYGEN SATURATION: 100 % | BODY MASS INDEX: 26.11 KG/M2 | TEMPERATURE: 98.7 F | DIASTOLIC BLOOD PRESSURE: 71 MMHG | WEIGHT: 152.12 LBS | HEART RATE: 72 BPM | SYSTOLIC BLOOD PRESSURE: 117 MMHG | RESPIRATION RATE: 16 BRPM

## 2019-07-22 LAB
BASOPHILS # BLD AUTO: 0.1 K/UL — SIGNIFICANT CHANGE UP (ref 0–0.2)
EOSINOPHIL # BLD AUTO: 0.1 K/UL — SIGNIFICANT CHANGE UP (ref 0–0.5)
EOSINOPHIL NFR BLD AUTO: 2 % — SIGNIFICANT CHANGE UP (ref 0–6)
HCT VFR BLD CALC: 35.1 % — SIGNIFICANT CHANGE UP (ref 34.5–45)
HGB BLD-MCNC: 11.9 G/DL — SIGNIFICANT CHANGE UP (ref 11.5–15.5)
LYMPHOCYTES # BLD AUTO: 10.5 K/UL — HIGH (ref 1–3.3)
LYMPHOCYTES # BLD AUTO: 59 % — HIGH (ref 13–44)
MCHC RBC-ENTMCNC: 27.5 PG — SIGNIFICANT CHANGE UP (ref 27–34)
MCHC RBC-ENTMCNC: 33.9 G/DL — SIGNIFICANT CHANGE UP (ref 32–36)
MCV RBC AUTO: 81 FL — SIGNIFICANT CHANGE UP (ref 80–100)
MONOCYTES # BLD AUTO: 0.4 K/UL — SIGNIFICANT CHANGE UP (ref 0–0.9)
MONOCYTES NFR BLD AUTO: 6 % — SIGNIFICANT CHANGE UP (ref 2–14)
NEUTROPHILS # BLD AUTO: 3.6 K/UL — SIGNIFICANT CHANGE UP (ref 1.8–7.4)
NEUTROPHILS NFR BLD AUTO: 33 % — LOW (ref 43–77)
PLAT MORPH BLD: NORMAL — SIGNIFICANT CHANGE UP
PLATELET # BLD AUTO: 157 K/UL — SIGNIFICANT CHANGE UP (ref 150–400)
RBC # BLD: 4.33 M/UL — SIGNIFICANT CHANGE UP (ref 3.8–5.2)
RBC # FLD: 11.8 % — SIGNIFICANT CHANGE UP (ref 10.3–14.5)
RBC BLD AUTO: SIGNIFICANT CHANGE UP
SMUDGE CELLS # BLD: PRESENT — SIGNIFICANT CHANGE UP
WBC # BLD: 14.7 K/UL — HIGH (ref 3.8–10.5)
WBC # FLD AUTO: 14.7 K/UL — HIGH (ref 3.8–10.5)

## 2019-07-22 PROCEDURE — 99214 OFFICE O/P EST MOD 30 MIN: CPT

## 2019-07-22 NOTE — HISTORY OF PRESENT ILLNESS
[de-identified] : CLL- stage 0.  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 \par \par She was found to have elevated white blood cell count on routine blood work.  CBC on 7/1- WBC 11.2 ALC 7750 Hg 12.0 Plt 192.  She was seen by Dr. Subha Charles.   Peripheral blood flow cytometry was sent 8/24/17- revealed a population of B cells, positive for CD19, CD20,negative for CD5 and CD10 with no light chain restriction seen.  Labs sent reveal a normal SPEP/LAINE, k/l ration 1.71, B2 microglobulin 1.41, IgG 889, IgM 87,ESR<2.  She underwent a bone marrow biopsy on 9/26 which revealed a normocellular marrow, trilineage hematopoiesis, minimal involvement with CLL (10-15%)- IHC positive CD19, CD20, CD5, CD23.  CLL FISH revealed deletion 13, no evidence of del 11 or 17.  IGVH is mutated, CD38 is not expressed.  For some reason FISH for bcr-abl was sent- negative along with CALR which was also normal.  She had a PET/CT completed on 9/7 that revealed a 5 mm nodule in her LLL, otherwise no lymphadenopathy or splenomegaly.  She denies any previous medical history. \par \par Labs from 7/31/17 show Monos 0.8, EOS 0.4%, Lymph 69.4%, Hgb 12.0, Polys 29.0, HCT 38.1, WBC 11.2, Plt 192K. Labs from 6/19/16 show Monos 3.5, EOS 0.5%, Lymph 56.7%, Hgb 12.0, Polys 38.9, HCT 36.2, WBC 11.3, Plt 192K, Cr 0.7. \par \par \par \par \par \par  [de-identified] : Patient presents for a follow up appointment. She is doing well today, notes the beginning of an irritation in the back of her throat. Patient states she is trying to get pregnant again. Appetite is stable - started a vegan diet about a week ago. She denies any fatigue, no fever/chills, no abdominal pain, no night sweats, no weight loss, no n/v/d. \par CT scan of her neck/chest revealed multiple mildly enlarged lymph nodes are seen. A right level I node measures 1.5 cm x 1 cm. A right level II node measures 1.8 cm x 1 cm. A right level III measures 1.8 cm x 1 cm.  A right axillary node measures 1.9 cm x 1.4 cm with a prominent fatty helium.  She states when she yawns, she feels tenderness in her right side of her neck.

## 2019-07-22 NOTE — ASSESSMENT
[FreeTextEntry1] : This is a 33 year old female with lymphocytosis, bone marrow biopsy is consistent with chronic lymphocytic leukemia.  CT of her neck/chest with small lymph nodes, likely due to CLL- stage I. 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 \par Her CBC is stable and she has no evidence of splenomegaly.\par She is asymptomatic.  Discussed indications for treatment including cytopenias, bulky disease- lymphadenopathy >10 cm, spleen >6 cm below the costal margin.  Will consider repeating a neck ultrasound in 3 months.\par Taking slow Fe every other day.  \par Recommend daily multivitamin.  \par Follow up in 3-4 months.

## 2019-07-22 NOTE — REVIEW OF SYSTEMS
[Negative] : Allergic/Immunologic [Fever] : no fever [Chills] : no chills [Night Sweats] : no night sweats [Fatigue] : no fatigue [Recent Change In Weight] : ~T no recent weight change [Muscle Pain] : no muscle pain

## 2019-07-22 NOTE — PHYSICAL EXAM
[Fully active, able to carry on all pre-disease performance without restriction] : Status 0 - Fully active, able to carry on all pre-disease performance without restriction [Normal] : affect appropriate [de-identified] : <1 cm palpable cervical/axillary lymph nodes

## 2019-07-22 NOTE — ADDENDUM
[FreeTextEntry1] : Documented by Beatriz Chang acting as a scribe for Dr. Deirdre Franco on 7/22/2019.\par \par All medical record entries made by the Scribe were at my, Dr. Deirdre Franco's, direction and personally dictated by me on 7/22/2019. I have reviewed the chart and agree that the record accurately reflects my personal performance of the history, physical exam, assessment and plan. I have also personally directed, reviewed, and agree with the discharge instructions.

## 2019-10-29 ENCOUNTER — OUTPATIENT (OUTPATIENT)
Dept: OUTPATIENT SERVICES | Facility: HOSPITAL | Age: 34
LOS: 1 days | Discharge: HOME | End: 2019-10-29

## 2019-10-29 DIAGNOSIS — C91.10 CHRONIC LYMPHOCYTIC LEUKEMIA OF B-CELL TYPE NOT HAVING ACHIEVED REMISSION: ICD-10-CM

## 2019-11-01 ENCOUNTER — OTHER (OUTPATIENT)
Age: 34
End: 2019-11-01

## 2019-11-07 ENCOUNTER — RESULT REVIEW (OUTPATIENT)
Age: 34
End: 2019-11-07

## 2019-11-07 ENCOUNTER — APPOINTMENT (OUTPATIENT)
Dept: HEMATOLOGY ONCOLOGY | Facility: CLINIC | Age: 34
End: 2019-11-07
Payer: COMMERCIAL

## 2019-11-07 VITALS
OXYGEN SATURATION: 100 % | SYSTOLIC BLOOD PRESSURE: 120 MMHG | RESPIRATION RATE: 16 BRPM | DIASTOLIC BLOOD PRESSURE: 77 MMHG | BODY MASS INDEX: 25.35 KG/M2 | HEART RATE: 69 BPM | TEMPERATURE: 98 F | WEIGHT: 147.71 LBS

## 2019-11-07 LAB
BASOPHILS # BLD AUTO: 0.1 K/UL — SIGNIFICANT CHANGE UP (ref 0–0.2)
EOSINOPHIL # BLD AUTO: 0.1 K/UL — SIGNIFICANT CHANGE UP (ref 0–0.5)
EOSINOPHIL NFR BLD AUTO: 1 % — SIGNIFICANT CHANGE UP (ref 0–6)
HCT VFR BLD CALC: 37.7 % — SIGNIFICANT CHANGE UP (ref 34.5–45)
HGB BLD-MCNC: 12.7 G/DL — SIGNIFICANT CHANGE UP (ref 11.5–15.5)
LYMPHOCYTES # BLD AUTO: 12.5 K/UL — HIGH (ref 1–3.3)
LYMPHOCYTES # BLD AUTO: 65 % — HIGH (ref 13–44)
MCHC RBC-ENTMCNC: 28.5 PG — SIGNIFICANT CHANGE UP (ref 27–34)
MCHC RBC-ENTMCNC: 33.6 G/DL — SIGNIFICANT CHANGE UP (ref 32–36)
MCV RBC AUTO: 84.6 FL — SIGNIFICANT CHANGE UP (ref 80–100)
MONOCYTES # BLD AUTO: 0.4 K/UL — SIGNIFICANT CHANGE UP (ref 0–0.9)
MONOCYTES NFR BLD AUTO: 10 % — SIGNIFICANT CHANGE UP (ref 2–14)
NEUTROPHILS # BLD AUTO: 3.5 K/UL — SIGNIFICANT CHANGE UP (ref 1.8–7.4)
NEUTROPHILS NFR BLD AUTO: 24 % — LOW (ref 43–77)
PLAT MORPH BLD: NORMAL — SIGNIFICANT CHANGE UP
PLATELET # BLD AUTO: 187 K/UL — SIGNIFICANT CHANGE UP (ref 150–400)
RBC # BLD: 4.45 M/UL — SIGNIFICANT CHANGE UP (ref 3.8–5.2)
RBC # FLD: 11.5 % — SIGNIFICANT CHANGE UP (ref 10.3–14.5)
RBC BLD AUTO: SIGNIFICANT CHANGE UP
WBC # BLD: 16.5 K/UL — HIGH (ref 3.8–10.5)
WBC # FLD AUTO: 16.5 K/UL — HIGH (ref 3.8–10.5)

## 2019-11-07 PROCEDURE — 99213 OFFICE O/P EST LOW 20 MIN: CPT

## 2019-11-07 NOTE — PHYSICAL EXAM
[Fully active, able to carry on all pre-disease performance without restriction] : Status 0 - Fully active, able to carry on all pre-disease performance without restriction [Normal] : grossly intact [de-identified] : no palpable cervical/axillary/inguinal lymph nodes [de-identified] : FROM in bilateral upper extremities

## 2019-11-07 NOTE — ASSESSMENT
[FreeTextEntry1] : This is a 34 year old female with lymphocytosis, bone marrow biopsy is consistent with chronic lymphocytic leukemia.  CT of her neck/chest with small lymph nodes, likely due to CLL- stage I. 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 \par Her CBC is stable and she has no evidence of splenomegaly.\par She is asymptomatic.  Discussed indications for treatment including cytopenias, bulky disease- lymphadenopathy >10 cm, spleen >6 cm below the costal margin.  \par Plan for follow up ultrasound of her neck.  \par Recommend daily multivitamin.  \par Follow up in 3-4 months.

## 2019-11-07 NOTE — HISTORY OF PRESENT ILLNESS
[de-identified] : CLL- stage 0.  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 \par \par She was found to have elevated white blood cell count on routine blood work.  CBC on 7/1- WBC 11.2 ALC 7750 Hg 12.0 Plt 192.  She was seen by Dr. Subha Charles.   Peripheral blood flow cytometry was sent 8/24/17- revealed a population of B cells, positive for CD19, CD20,negative for CD5 and CD10 with no light chain restriction seen.  Labs sent reveal a normal SPEP/LAINE, k/l ration 1.71, B2 microglobulin 1.41, IgG 889, IgM 87,ESR<2.  She underwent a bone marrow biopsy on 9/26 which revealed a normocellular marrow, trilineage hematopoiesis, minimal involvement with CLL (10-15%)- IHC positive CD19, CD20, CD5, CD23.  CLL FISH revealed deletion 13, no evidence of del 11 or 17.  IGVH is mutated, CD38 is not expressed.  For some reason FISH for bcr-abl was sent- negative along with CALR which was also normal.  She had a PET/CT completed on 9/7 that revealed a 5 mm nodule in her LLL, otherwise no lymphadenopathy or splenomegaly.  She denies any previous medical history. \par \par Labs from 7/31/17 show Monos 0.8, EOS 0.4%, Lymph 69.4%, Hgb 12.0, Polys 29.0, HCT 38.1, WBC 11.2, Plt 192K. Labs from 6/19/16 show Monos 3.5, EOS 0.5%, Lymph 56.7%, Hgb 12.0, Polys 38.9, HCT 36.2, WBC 11.3, Plt 192K, Cr 0.7. \par \par \par \par \par \par  [de-identified] : Patient presents for a follow up appointment. She is doing well today, notes she was seen by her PCP on Monday. Continues to note muscle pain in her right arm, both in her elbow and forearm, states she is due to see a Orthopedic Surgeon soon. She denies any fatigue, no fever/chills, no abdominal pain, no night sweats, no weight loss, no n/v/d. Her appetite is stable, continues to follow a vegan diet but allows for eggs.

## 2019-11-07 NOTE — ADDENDUM
[FreeTextEntry1] : Documented by Beatriz Chang acting as a scribe for Dr. Franco on 11/07/2019\par \par All medical record entries made by the Scribe were at my, Dr. Franco's, direction and\par personally dictated by me on 11/07/2019. I have reviewed the chart and agree that the record\par accurately reflects my personal performance of the history, physical exam, procedure and imaging.

## 2019-11-15 ENCOUNTER — RESULT REVIEW (OUTPATIENT)
Age: 34
End: 2019-11-15

## 2019-12-12 ENCOUNTER — FORM ENCOUNTER (OUTPATIENT)
Age: 34
End: 2019-12-12

## 2019-12-13 ENCOUNTER — APPOINTMENT (OUTPATIENT)
Dept: ULTRASOUND IMAGING | Facility: IMAGING CENTER | Age: 34
End: 2019-12-13
Payer: COMMERCIAL

## 2019-12-13 ENCOUNTER — OUTPATIENT (OUTPATIENT)
Dept: OUTPATIENT SERVICES | Facility: HOSPITAL | Age: 34
LOS: 1 days | End: 2019-12-13
Payer: COMMERCIAL

## 2019-12-13 DIAGNOSIS — C91.10 CHRONIC LYMPHOCYTIC LEUKEMIA OF B-CELL TYPE NOT HAVING ACHIEVED REMISSION: ICD-10-CM

## 2019-12-13 PROCEDURE — 76536 US EXAM OF HEAD AND NECK: CPT | Mod: 26

## 2019-12-13 PROCEDURE — 76536 US EXAM OF HEAD AND NECK: CPT

## 2020-01-13 ENCOUNTER — APPOINTMENT (OUTPATIENT)
Dept: OBGYN | Facility: CLINIC | Age: 35
End: 2020-01-13

## 2020-01-27 ENCOUNTER — APPOINTMENT (OUTPATIENT)
Dept: ANTEPARTUM | Facility: CLINIC | Age: 35
End: 2020-01-27
Payer: COMMERCIAL

## 2020-01-27 PROCEDURE — 76801 OB US < 14 WKS SINGLE FETUS: CPT

## 2020-01-27 PROCEDURE — 76817 TRANSVAGINAL US OBSTETRIC: CPT

## 2020-02-08 ENCOUNTER — OUTPATIENT (OUTPATIENT)
Dept: OUTPATIENT SERVICES | Facility: HOSPITAL | Age: 35
LOS: 1 days | Discharge: HOME | End: 2020-02-08

## 2020-02-08 DIAGNOSIS — C91.10 CHRONIC LYMPHOCYTIC LEUKEMIA OF B-CELL TYPE NOT HAVING ACHIEVED REMISSION: ICD-10-CM

## 2020-02-12 ENCOUNTER — RESULT REVIEW (OUTPATIENT)
Age: 35
End: 2020-02-12

## 2020-02-12 ENCOUNTER — APPOINTMENT (OUTPATIENT)
Dept: HEMATOLOGY ONCOLOGY | Facility: CLINIC | Age: 35
End: 2020-02-12
Payer: COMMERCIAL

## 2020-02-12 VITALS
DIASTOLIC BLOOD PRESSURE: 70 MMHG | SYSTOLIC BLOOD PRESSURE: 114 MMHG | OXYGEN SATURATION: 100 % | HEART RATE: 73 BPM | RESPIRATION RATE: 15 BRPM | BODY MASS INDEX: 25.73 KG/M2 | WEIGHT: 149.91 LBS | TEMPERATURE: 98.2 F

## 2020-02-12 LAB
BASOPHILS # BLD AUTO: 0 K/UL — SIGNIFICANT CHANGE UP (ref 0–0.2)
BASOPHILS NFR BLD AUTO: 2 % — SIGNIFICANT CHANGE UP (ref 0–2)
EOSINOPHIL # BLD AUTO: 0.1 K/UL — SIGNIFICANT CHANGE UP (ref 0–0.5)
EOSINOPHIL NFR BLD AUTO: 5 % — SIGNIFICANT CHANGE UP (ref 0–6)
HCT VFR BLD CALC: 33.7 % — LOW (ref 34.5–45)
HGB BLD-MCNC: 11.6 G/DL — SIGNIFICANT CHANGE UP (ref 11.5–15.5)
LYMPHOCYTES # BLD AUTO: 50 % — HIGH (ref 13–44)
LYMPHOCYTES # BLD AUTO: 6.9 K/UL — HIGH (ref 1–3.3)
MCHC RBC-ENTMCNC: 28.1 PG — SIGNIFICANT CHANGE UP (ref 27–34)
MCHC RBC-ENTMCNC: 34.3 G/DL — SIGNIFICANT CHANGE UP (ref 32–36)
MCV RBC AUTO: 82.1 FL — SIGNIFICANT CHANGE UP (ref 80–100)
MONOCYTES # BLD AUTO: 4.2 K/UL — HIGH (ref 0–0.9)
MONOCYTES NFR BLD AUTO: 13 % — SIGNIFICANT CHANGE UP (ref 2–14)
NEUTROPHILS # BLD AUTO: 4.4 K/UL — SIGNIFICANT CHANGE UP (ref 1.8–7.4)
NEUTROPHILS NFR BLD AUTO: 30 % — LOW (ref 43–77)
PLAT MORPH BLD: NORMAL — SIGNIFICANT CHANGE UP
PLATELET # BLD AUTO: 136 K/UL — LOW (ref 150–400)
RBC # BLD: 4.11 M/UL — SIGNIFICANT CHANGE UP (ref 3.8–5.2)
RBC # FLD: 11.5 % — SIGNIFICANT CHANGE UP (ref 10.3–14.5)
RBC BLD AUTO: SIGNIFICANT CHANGE UP
WBC # BLD: 15.6 K/UL — HIGH (ref 3.8–10.5)
WBC # FLD AUTO: 15.6 K/UL — HIGH (ref 3.8–10.5)

## 2020-02-12 PROCEDURE — 99214 OFFICE O/P EST MOD 30 MIN: CPT

## 2020-02-12 NOTE — ADDENDUM
[FreeTextEntry1] : Documented by Beatriz Chang acting as a scribe for Dr. Franco on 02/12/2020\par \par All medical record entries made by the Scribe were at my, Dr. Franco's, direction and\par personally dictated by me on 02/12/2020. I have reviewed the chart and agree that the record\par accurately reflects my personal performance of the history, physical exam, procedure and imaging.

## 2020-02-12 NOTE — ASSESSMENT
[FreeTextEntry1] : This is a 34 year old female with lymphocytosis, bone marrow biopsy is consistent with chronic lymphocytic leukemia.  CT of her neck/chest with small lymph nodes, likely due to CLL- stage I. 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 \par Her WBC/Hg are normal, PLT are 136 today.  They were normal last month.  Plan to repeat in 2-3 months. \par She is asymptomatic.  Discussed indications for treatment including cytopenias, bulky disease- lymphadenopathy >10 cm, spleen >6 cm below the costal margin.  \par Continue Pre- vitamins with iron.  Encourage iron rich foods as well.   \par

## 2020-02-12 NOTE — PHYSICAL EXAM
[Fully active, able to carry on all pre-disease performance without restriction] : Status 0 - Fully active, able to carry on all pre-disease performance without restriction [Normal] : affect appropriate [de-identified] : no palpable cervical/axillary/inguinal lymph nodes

## 2020-02-12 NOTE — HISTORY OF PRESENT ILLNESS
[de-identified] : CLL- stage 0.  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 \par \par She was found to have elevated white blood cell count on routine blood work.  CBC on 7/1- WBC 11.2 ALC 7750 Hg 12.0 Plt 192.  She was seen by Dr. Subha Charles.   Peripheral blood flow cytometry was sent 8/24/17- revealed a population of B cells, positive for CD19, CD20,negative for CD5 and CD10 with no light chain restriction seen.  Labs sent reveal a normal SPEP/LAINE, k/l ration 1.71, B2 microglobulin 1.41, IgG 889, IgM 87,ESR<2.  She underwent a bone marrow biopsy on 9/26 which revealed a normocellular marrow, trilineage hematopoiesis, minimal involvement with CLL (10-15%)- IHC positive CD19, CD20, CD5, CD23.  CLL FISH revealed deletion 13, no evidence of del 11 or 17.  IGVH is mutated, CD38 is not expressed.  For some reason FISH for bcr-abl was sent- negative along with CALR which was also normal.  She had a PET/CT completed on 9/7 that revealed a 5 mm nodule in her LLL, otherwise no lymphadenopathy or splenomegaly.  She denies any previous medical history. \par \par Labs from 7/31/17 show Monos 0.8, EOS 0.4%, Lymph 69.4%, Hgb 12.0, Polys 29.0, HCT 38.1, WBC 11.2, Plt 192K. Labs from 6/19/16 show Monos 3.5, EOS 0.5%, Lymph 56.7%, Hgb 12.0, Polys 38.9, HCT 36.2, WBC 11.3, Plt 192K, Cr 0.7. \par \par \par \par \par \par  [de-identified] : Patient presents for a follow up appointment. She is doing well today, notes she is 10 weeks pregnant - due in September. Notes she was found to have a high TSH at her OBGYN - Dr. Oscar, was seen by the NP. Repeat TSH with her PCP was normal. Was also told she was positive for HPV, repeat test to be done after delivery. She denies any fatigue, no fever/chills, no abdominal pain, no night sweats, no weight loss, no n/v/d. Her appetite is fair continues to follow a vegan diet. Is currently on Pre-Demarcus vitamins with iron and folic acid.

## 2020-02-18 ENCOUNTER — APPOINTMENT (OUTPATIENT)
Dept: ANTEPARTUM | Facility: CLINIC | Age: 35
End: 2020-02-18
Payer: COMMERCIAL

## 2020-02-18 PROCEDURE — 76813 OB US NUCHAL MEAS 1 GEST: CPT

## 2020-02-18 PROCEDURE — 76801 OB US < 14 WKS SINGLE FETUS: CPT

## 2020-03-17 ENCOUNTER — APPOINTMENT (OUTPATIENT)
Dept: OBGYN | Facility: CLINIC | Age: 35
End: 2020-03-17

## 2020-04-11 LAB
ALBUMIN SERPL ELPH-MCNC: 4.4 G/DL
ALBUMIN SERPL ELPH-MCNC: 4.6 G/DL
ALBUMIN SERPL ELPH-MCNC: 4.6 G/DL
ALBUMIN SERPL ELPH-MCNC: 5 G/DL
ALP BLD-CCNC: 31 U/L
ALP BLD-CCNC: 41 U/L
ALP BLD-CCNC: 43 U/L
ALP BLD-CCNC: 46 U/L
ALT SERPL-CCNC: 19 U/L
ALT SERPL-CCNC: 20 U/L
ALT SERPL-CCNC: 24 U/L
ALT SERPL-CCNC: 24 U/L
ANION GAP SERPL CALC-SCNC: 11 MMOL/L
ANION GAP SERPL CALC-SCNC: 11 MMOL/L
ANION GAP SERPL CALC-SCNC: 12 MMOL/L
ANION GAP SERPL CALC-SCNC: 13 MMOL/L
AST SERPL-CCNC: 14 U/L
AST SERPL-CCNC: 16 U/L
AST SERPL-CCNC: 18 U/L
AST SERPL-CCNC: 18 U/L
BILIRUB SERPL-MCNC: 0.2 MG/DL
BILIRUB SERPL-MCNC: 0.2 MG/DL
BILIRUB SERPL-MCNC: 0.3 MG/DL
BILIRUB SERPL-MCNC: 0.3 MG/DL
BUN SERPL-MCNC: 10 MG/DL
BUN SERPL-MCNC: 12 MG/DL
BUN SERPL-MCNC: 13 MG/DL
BUN SERPL-MCNC: 8 MG/DL
CALCIUM SERPL-MCNC: 9.4 MG/DL
CALCIUM SERPL-MCNC: 9.5 MG/DL
CALCIUM SERPL-MCNC: 9.6 MG/DL
CALCIUM SERPL-MCNC: 9.9 MG/DL
CHLORIDE SERPL-SCNC: 102 MMOL/L
CHLORIDE SERPL-SCNC: 103 MMOL/L
CO2 SERPL-SCNC: 24 MMOL/L
CO2 SERPL-SCNC: 25 MMOL/L
CO2 SERPL-SCNC: 25 MMOL/L
CO2 SERPL-SCNC: 27 MMOL/L
CREAT SERPL-MCNC: 0.55 MG/DL
CREAT SERPL-MCNC: 0.63 MG/DL
CREAT SERPL-MCNC: 0.67 MG/DL
CREAT SERPL-MCNC: 0.74 MG/DL
GLUCOSE SERPL-MCNC: 109 MG/DL
GLUCOSE SERPL-MCNC: 110 MG/DL
GLUCOSE SERPL-MCNC: 94 MG/DL
GLUCOSE SERPL-MCNC: 96 MG/DL
LDH SERPL-CCNC: 111 U/L
LDH SERPL-CCNC: 122 U/L
LDH SERPL-CCNC: 136 U/L
LDH SERPL-CCNC: 149 U/L
POTASSIUM SERPL-SCNC: 4 MMOL/L
POTASSIUM SERPL-SCNC: 4.1 MMOL/L
POTASSIUM SERPL-SCNC: 4.5 MMOL/L
POTASSIUM SERPL-SCNC: 4.6 MMOL/L
PROT SERPL-MCNC: 6.6 G/DL
PROT SERPL-MCNC: 6.9 G/DL
PROT SERPL-MCNC: 7.2 G/DL
PROT SERPL-MCNC: 7.3 G/DL
SODIUM SERPL-SCNC: 138 MMOL/L
SODIUM SERPL-SCNC: 139 MMOL/L
SODIUM SERPL-SCNC: 140 MMOL/L
SODIUM SERPL-SCNC: 140 MMOL/L

## 2020-04-14 ENCOUNTER — APPOINTMENT (OUTPATIENT)
Dept: ANTEPARTUM | Facility: CLINIC | Age: 35
End: 2020-04-14
Payer: COMMERCIAL

## 2020-04-14 PROCEDURE — 76817 TRANSVAGINAL US OBSTETRIC: CPT

## 2020-04-14 PROCEDURE — 76805 OB US >/= 14 WKS SNGL FETUS: CPT

## 2020-05-13 ENCOUNTER — APPOINTMENT (OUTPATIENT)
Dept: HEMATOLOGY ONCOLOGY | Facility: CLINIC | Age: 35
End: 2020-05-13

## 2020-05-29 ENCOUNTER — APPOINTMENT (OUTPATIENT)
Dept: OBGYN | Facility: CLINIC | Age: 35
End: 2020-05-29

## 2020-06-05 ENCOUNTER — APPOINTMENT (OUTPATIENT)
Dept: OBGYN | Facility: CLINIC | Age: 35
End: 2020-06-05
Payer: COMMERCIAL

## 2020-06-05 PROCEDURE — 0502F SUBSEQUENT PRENATAL CARE: CPT

## 2020-06-07 ENCOUNTER — OUTPATIENT (OUTPATIENT)
Dept: OUTPATIENT SERVICES | Facility: HOSPITAL | Age: 35
LOS: 1 days | Discharge: HOME | End: 2020-06-07

## 2020-06-07 DIAGNOSIS — C91.10 CHRONIC LYMPHOCYTIC LEUKEMIA OF B-CELL TYPE NOT HAVING ACHIEVED REMISSION: ICD-10-CM

## 2020-06-11 ENCOUNTER — APPOINTMENT (OUTPATIENT)
Dept: HEMATOLOGY ONCOLOGY | Facility: CLINIC | Age: 35
End: 2020-06-11

## 2020-06-18 ENCOUNTER — APPOINTMENT (OUTPATIENT)
Dept: OBGYN | Facility: CLINIC | Age: 35
End: 2020-06-18
Payer: MEDICAID

## 2020-06-18 PROCEDURE — 0502F SUBSEQUENT PRENATAL CARE: CPT

## 2020-06-26 ENCOUNTER — APPOINTMENT (OUTPATIENT)
Dept: OBGYN | Facility: CLINIC | Age: 35
End: 2020-06-26

## 2020-07-02 ENCOUNTER — APPOINTMENT (OUTPATIENT)
Dept: OBGYN | Facility: CLINIC | Age: 35
End: 2020-07-02
Payer: MEDICAID

## 2020-07-02 PROCEDURE — 0502F SUBSEQUENT PRENATAL CARE: CPT

## 2020-07-10 ENCOUNTER — APPOINTMENT (OUTPATIENT)
Dept: OBGYN | Facility: CLINIC | Age: 35
End: 2020-07-10
Payer: MEDICAID

## 2020-07-10 PROCEDURE — 76816 OB US FOLLOW-UP PER FETUS: CPT

## 2020-07-10 PROCEDURE — 76819 FETAL BIOPHYS PROFIL W/O NST: CPT

## 2020-07-24 ENCOUNTER — APPOINTMENT (OUTPATIENT)
Dept: OBGYN | Facility: CLINIC | Age: 35
End: 2020-07-24
Payer: MEDICAID

## 2020-07-24 PROCEDURE — 0502F SUBSEQUENT PRENATAL CARE: CPT

## 2020-08-07 ENCOUNTER — APPOINTMENT (OUTPATIENT)
Dept: OBGYN | Facility: CLINIC | Age: 35
End: 2020-08-07
Payer: MEDICAID

## 2020-08-07 PROCEDURE — 0502F SUBSEQUENT PRENATAL CARE: CPT

## 2020-08-08 ENCOUNTER — APPOINTMENT (OUTPATIENT)
Dept: ANTEPARTUM | Facility: CLINIC | Age: 35
End: 2020-08-08
Payer: MEDICAID

## 2020-08-08 PROCEDURE — 76816 OB US FOLLOW-UP PER FETUS: CPT

## 2020-08-08 PROCEDURE — 76818 FETAL BIOPHYS PROFILE W/NST: CPT

## 2020-08-15 ENCOUNTER — APPOINTMENT (OUTPATIENT)
Dept: ANTEPARTUM | Facility: CLINIC | Age: 35
End: 2020-08-15
Payer: MEDICAID

## 2020-08-15 PROCEDURE — 76818 FETAL BIOPHYS PROFILE W/NST: CPT

## 2020-08-20 ENCOUNTER — OUTPATIENT (OUTPATIENT)
Dept: OUTPATIENT SERVICES | Facility: HOSPITAL | Age: 35
LOS: 1 days | End: 2020-08-20

## 2020-08-20 VITALS
WEIGHT: 173.94 LBS | DIASTOLIC BLOOD PRESSURE: 71 MMHG | HEART RATE: 78 BPM | HEIGHT: 63 IN | SYSTOLIC BLOOD PRESSURE: 118 MMHG | RESPIRATION RATE: 16 BRPM | TEMPERATURE: 98 F | OXYGEN SATURATION: 98 %

## 2020-08-20 DIAGNOSIS — Z98.891 HISTORY OF UTERINE SCAR FROM PREVIOUS SURGERY: ICD-10-CM

## 2020-08-20 DIAGNOSIS — Z98.891 HISTORY OF UTERINE SCAR FROM PREVIOUS SURGERY: Chronic | ICD-10-CM

## 2020-08-20 DIAGNOSIS — O34.219 MATERNAL CARE FOR UNSPECIFIED TYPE SCAR FROM PREVIOUS CESAREAN DELIVERY: ICD-10-CM

## 2020-08-20 LAB
ANION GAP SERPL CALC-SCNC: 12 MMO/L — SIGNIFICANT CHANGE UP (ref 7–14)
APPEARANCE UR: CLEAR — SIGNIFICANT CHANGE UP
BACTERIA # UR AUTO: SIGNIFICANT CHANGE UP
BILIRUB UR-MCNC: NEGATIVE — SIGNIFICANT CHANGE UP
BLD GP AB SCN SERPL QL: NEGATIVE — SIGNIFICANT CHANGE UP
BLOOD UR QL VISUAL: NEGATIVE — SIGNIFICANT CHANGE UP
BUN SERPL-MCNC: 11 MG/DL — SIGNIFICANT CHANGE UP (ref 7–23)
CALCIUM SERPL-MCNC: 9.3 MG/DL — SIGNIFICANT CHANGE UP (ref 8.4–10.5)
CHLORIDE SERPL-SCNC: 107 MMOL/L — SIGNIFICANT CHANGE UP (ref 98–107)
CO2 SERPL-SCNC: 21 MMOL/L — LOW (ref 22–31)
COLOR SPEC: YELLOW — SIGNIFICANT CHANGE UP
CREAT SERPL-MCNC: 0.65 MG/DL — SIGNIFICANT CHANGE UP (ref 0.5–1.3)
GLUCOSE SERPL-MCNC: 116 MG/DL — HIGH (ref 70–99)
GLUCOSE UR-MCNC: NEGATIVE — SIGNIFICANT CHANGE UP
HCT VFR BLD CALC: 35.8 % — SIGNIFICANT CHANGE UP (ref 34.5–45)
HGB BLD-MCNC: 11 G/DL — LOW (ref 11.5–15.5)
HYALINE CASTS # UR AUTO: SIGNIFICANT CHANGE UP
KETONES UR-MCNC: SIGNIFICANT CHANGE UP
LEUKOCYTE ESTERASE UR-ACNC: SIGNIFICANT CHANGE UP
MCHC RBC-ENTMCNC: 27.2 PG — SIGNIFICANT CHANGE UP (ref 27–34)
MCHC RBC-ENTMCNC: 30.7 % — LOW (ref 32–36)
MCV RBC AUTO: 88.4 FL — SIGNIFICANT CHANGE UP (ref 80–100)
NITRITE UR-MCNC: NEGATIVE — SIGNIFICANT CHANGE UP
NRBC # FLD: 0 K/UL — SIGNIFICANT CHANGE UP (ref 0–0)
PH UR: 6.5 — SIGNIFICANT CHANGE UP (ref 5–8)
PLATELET # BLD AUTO: 129 K/UL — LOW (ref 150–400)
PMV BLD: 11.1 FL — SIGNIFICANT CHANGE UP (ref 7–13)
POTASSIUM SERPL-MCNC: 4.3 MMOL/L — SIGNIFICANT CHANGE UP (ref 3.5–5.3)
POTASSIUM SERPL-SCNC: 4.3 MMOL/L — SIGNIFICANT CHANGE UP (ref 3.5–5.3)
PROT UR-MCNC: 30 — SIGNIFICANT CHANGE UP
RBC # BLD: 4.05 M/UL — SIGNIFICANT CHANGE UP (ref 3.8–5.2)
RBC # FLD: 14 % — SIGNIFICANT CHANGE UP (ref 10.3–14.5)
RBC CASTS # UR COMP ASSIST: SIGNIFICANT CHANGE UP (ref 0–?)
RH IG SCN BLD-IMP: POSITIVE — SIGNIFICANT CHANGE UP
SODIUM SERPL-SCNC: 140 MMOL/L — SIGNIFICANT CHANGE UP (ref 135–145)
SP GR SPEC: 1.03 — SIGNIFICANT CHANGE UP (ref 1–1.04)
SQUAMOUS # UR AUTO: SIGNIFICANT CHANGE UP
UROBILINOGEN FLD QL: NORMAL — SIGNIFICANT CHANGE UP
WBC # BLD: 19.65 K/UL — HIGH (ref 3.8–10.5)
WBC # FLD AUTO: 19.65 K/UL — HIGH (ref 3.8–10.5)
WBC UR QL: HIGH (ref 0–?)

## 2020-08-20 RX ORDER — SODIUM CHLORIDE 9 MG/ML
1000 INJECTION, SOLUTION INTRAVENOUS
Refills: 0 | Status: DISCONTINUED | OUTPATIENT
Start: 2020-08-26 | End: 2020-08-26

## 2020-08-20 NOTE — OB PST NOTE - PROBLEM SELECTOR PLAN 1
Pt. is scheduled for a repeat  section 20.  Pt. verbalized understanding of instructions and that Chlorhexidine is for external use.  Pt. instructed to obtain dosing for Basaglar prior to surgery from her OB.  Pt. is scheduled for COVID test 20.

## 2020-08-20 NOTE — OB PST NOTE - NSHPPHYSICALEXAM_GEN_ALL_CORE

## 2020-08-22 ENCOUNTER — APPOINTMENT (OUTPATIENT)
Dept: ANTEPARTUM | Facility: CLINIC | Age: 35
End: 2020-08-22
Payer: MEDICAID

## 2020-08-22 PROCEDURE — 76820 UMBILICAL ARTERY ECHO: CPT

## 2020-08-22 PROCEDURE — 76818 FETAL BIOPHYS PROFILE W/NST: CPT

## 2020-08-23 ENCOUNTER — APPOINTMENT (OUTPATIENT)
Dept: DISASTER EMERGENCY | Facility: CLINIC | Age: 35
End: 2020-08-23

## 2020-08-23 DIAGNOSIS — Z01.818 ENCOUNTER FOR OTHER PREPROCEDURAL EXAMINATION: ICD-10-CM

## 2020-08-25 ENCOUNTER — TRANSCRIPTION ENCOUNTER (OUTPATIENT)
Age: 35
End: 2020-08-25

## 2020-08-26 ENCOUNTER — INPATIENT (INPATIENT)
Facility: HOSPITAL | Age: 35
LOS: 1 days | Discharge: HOME | End: 2020-08-28
Attending: OBSTETRICS & GYNECOLOGY | Admitting: OBSTETRICS & GYNECOLOGY
Payer: MEDICAID

## 2020-08-26 ENCOUNTER — TRANSCRIPTION ENCOUNTER (OUTPATIENT)
Age: 35
End: 2020-08-26

## 2020-08-26 VITALS — HEART RATE: 81 BPM | SYSTOLIC BLOOD PRESSURE: 112 MMHG | DIASTOLIC BLOOD PRESSURE: 63 MMHG

## 2020-08-26 DIAGNOSIS — O34.219 MATERNAL CARE FOR UNSPECIFIED TYPE SCAR FROM PREVIOUS CESAREAN DELIVERY: ICD-10-CM

## 2020-08-26 DIAGNOSIS — Z98.891 HISTORY OF UTERINE SCAR FROM PREVIOUS SURGERY: Chronic | ICD-10-CM

## 2020-08-26 DIAGNOSIS — Z98.891 HISTORY OF UTERINE SCAR FROM PREVIOUS SURGERY: ICD-10-CM

## 2020-08-26 LAB
ANISOCYTOSIS BLD QL: SLIGHT — SIGNIFICANT CHANGE UP
APTT BLD: 25.6 SEC — LOW (ref 27–36.3)
BASOPHILS # BLD AUTO: 0.05 K/UL — SIGNIFICANT CHANGE UP (ref 0–0.2)
BASOPHILS NFR BLD AUTO: 0.2 % — SIGNIFICANT CHANGE UP (ref 0–2)
BASOPHILS NFR SPEC: 0 % — SIGNIFICANT CHANGE UP (ref 0–2)
BLD GP AB SCN SERPL QL: NEGATIVE — SIGNIFICANT CHANGE UP
EOSINOPHIL # BLD AUTO: 0.03 K/UL — SIGNIFICANT CHANGE UP (ref 0–0.5)
EOSINOPHIL NFR BLD AUTO: 0.1 % — SIGNIFICANT CHANGE UP (ref 0–6)
EOSINOPHIL NFR FLD: 0 % — SIGNIFICANT CHANGE UP (ref 0–6)
FIBRINOGEN PPP-MCNC: 636 MG/DL — HIGH (ref 290–520)
GLUCOSE BLDC GLUCOMTR-MCNC: 109 MG/DL — HIGH (ref 70–99)
GLUCOSE BLDC GLUCOMTR-MCNC: 111 MG/DL — HIGH (ref 70–99)
HCT VFR BLD CALC: 39.6 % — SIGNIFICANT CHANGE UP (ref 34.5–45)
HGB BLD-MCNC: 12.3 G/DL — SIGNIFICANT CHANGE UP (ref 11.5–15.5)
IMM GRANULOCYTES NFR BLD AUTO: 0.5 % — SIGNIFICANT CHANGE UP (ref 0–1.5)
INR BLD: 1.05 — SIGNIFICANT CHANGE UP (ref 0.88–1.16)
LYMPHOCYTES # BLD AUTO: 15.91 K/UL — HIGH (ref 1–3.3)
LYMPHOCYTES # BLD AUTO: 66.9 % — HIGH (ref 13–44)
LYMPHOCYTES NFR SPEC AUTO: 58 % — HIGH (ref 13–44)
MANUAL SMEAR VERIFICATION: SIGNIFICANT CHANGE UP
MCHC RBC-ENTMCNC: 27.2 PG — SIGNIFICANT CHANGE UP (ref 27–34)
MCHC RBC-ENTMCNC: 31.1 % — LOW (ref 32–36)
MCV RBC AUTO: 87.6 FL — SIGNIFICANT CHANGE UP (ref 80–100)
MONOCYTES # BLD AUTO: 0.63 K/UL — SIGNIFICANT CHANGE UP (ref 0–0.9)
MONOCYTES NFR BLD AUTO: 2.6 % — SIGNIFICANT CHANGE UP (ref 2–14)
MONOCYTES NFR BLD: 4 % — SIGNIFICANT CHANGE UP (ref 2–9)
NEUTROPHIL AB SER-ACNC: 38 % — LOW (ref 43–77)
NEUTROPHILS # BLD AUTO: 7.05 K/UL — SIGNIFICANT CHANGE UP (ref 1.8–7.4)
NEUTROPHILS NFR BLD AUTO: 29.7 % — LOW (ref 43–77)
NRBC # BLD: 0 /100WBC — SIGNIFICANT CHANGE UP
NRBC # FLD: 0 K/UL — SIGNIFICANT CHANGE UP (ref 0–0)
OVALOCYTES BLD QL SMEAR: SLIGHT — SIGNIFICANT CHANGE UP
PLATELET # BLD AUTO: 127 K/UL — LOW (ref 150–400)
PLATELET CLUMP BLD QL SMEAR: SIGNIFICANT CHANGE UP
PMV BLD: 11.1 FL — SIGNIFICANT CHANGE UP (ref 7–13)
POIKILOCYTOSIS BLD QL AUTO: SLIGHT — SIGNIFICANT CHANGE UP
POLYCHROMASIA BLD QL SMEAR: SLIGHT — SIGNIFICANT CHANGE UP
PROTHROM AB SERPL-ACNC: 11.9 SEC — SIGNIFICANT CHANGE UP (ref 10.6–13.6)
RBC # BLD: 4.52 M/UL — SIGNIFICANT CHANGE UP (ref 3.8–5.2)
RBC # FLD: 14 % — SIGNIFICANT CHANGE UP (ref 10.3–14.5)
RH IG SCN BLD-IMP: POSITIVE — SIGNIFICANT CHANGE UP
SMUDGE CELLS # BLD: PRESENT — SIGNIFICANT CHANGE UP
T PALLIDUM AB TITR SER: NEGATIVE — SIGNIFICANT CHANGE UP
WBC # BLD: 23.78 K/UL — HIGH (ref 3.8–10.5)
WBC # FLD AUTO: 23.78 K/UL — HIGH (ref 3.8–10.5)

## 2020-08-26 PROCEDURE — 59025 FETAL NON-STRESS TEST: CPT | Mod: 26,AS

## 2020-08-26 PROCEDURE — 59514 CESAREAN DELIVERY ONLY: CPT | Mod: AS,U9

## 2020-08-26 PROCEDURE — 59510 CESAREAN DELIVERY: CPT | Mod: U9

## 2020-08-26 RX ORDER — OXYTOCIN 10 UNIT/ML
333.33 VIAL (ML) INJECTION
Qty: 20 | Refills: 0 | Status: COMPLETED | OUTPATIENT
Start: 2020-08-26 | End: 2020-08-26

## 2020-08-26 RX ORDER — OXYCODONE HYDROCHLORIDE 5 MG/1
5 TABLET ORAL ONCE
Refills: 0 | Status: DISCONTINUED | OUTPATIENT
Start: 2020-08-26 | End: 2020-08-28

## 2020-08-26 RX ORDER — SODIUM CHLORIDE 9 MG/ML
1000 INJECTION, SOLUTION INTRAVENOUS
Refills: 0 | Status: DISCONTINUED | OUTPATIENT
Start: 2020-08-26 | End: 2020-08-27

## 2020-08-26 RX ORDER — OXYCODONE HYDROCHLORIDE 5 MG/1
10 TABLET ORAL
Refills: 0 | Status: DISCONTINUED | OUTPATIENT
Start: 2020-08-26 | End: 2020-08-27

## 2020-08-26 RX ORDER — TETANUS TOXOID, REDUCED DIPHTHERIA TOXOID AND ACELLULAR PERTUSSIS VACCINE, ADSORBED 5; 2.5; 8; 8; 2.5 [IU]/.5ML; [IU]/.5ML; UG/.5ML; UG/.5ML; UG/.5ML
0.5 SUSPENSION INTRAMUSCULAR ONCE
Refills: 0 | Status: DISCONTINUED | OUTPATIENT
Start: 2020-08-26 | End: 2020-08-28

## 2020-08-26 RX ORDER — OXYCODONE HYDROCHLORIDE 5 MG/1
5 TABLET ORAL
Refills: 0 | Status: DISCONTINUED | OUTPATIENT
Start: 2020-08-26 | End: 2020-08-27

## 2020-08-26 RX ORDER — MORPHINE SULFATE 50 MG/1
0.1 CAPSULE, EXTENDED RELEASE ORAL ONCE
Refills: 0 | Status: DISCONTINUED | OUTPATIENT
Start: 2020-08-26 | End: 2020-08-28

## 2020-08-26 RX ORDER — METOCLOPRAMIDE HCL 10 MG
10 TABLET ORAL ONCE
Refills: 0 | Status: COMPLETED | OUTPATIENT
Start: 2020-08-26 | End: 2020-08-26

## 2020-08-26 RX ORDER — OXYCODONE HYDROCHLORIDE 5 MG/1
5 TABLET ORAL
Refills: 0 | Status: COMPLETED | OUTPATIENT
Start: 2020-08-26 | End: 2020-09-02

## 2020-08-26 RX ORDER — NALOXONE HYDROCHLORIDE 4 MG/.1ML
0.1 SPRAY NASAL
Refills: 0 | Status: DISCONTINUED | OUTPATIENT
Start: 2020-08-26 | End: 2020-08-27

## 2020-08-26 RX ORDER — KETOROLAC TROMETHAMINE 30 MG/ML
30 SYRINGE (ML) INJECTION EVERY 6 HOURS
Refills: 0 | Status: DISCONTINUED | OUTPATIENT
Start: 2020-08-26 | End: 2020-08-27

## 2020-08-26 RX ORDER — ONDANSETRON 8 MG/1
4 TABLET, FILM COATED ORAL EVERY 6 HOURS
Refills: 0 | Status: DISCONTINUED | OUTPATIENT
Start: 2020-08-26 | End: 2020-08-28

## 2020-08-26 RX ORDER — ACETAMINOPHEN 500 MG
975 TABLET ORAL
Refills: 0 | Status: DISCONTINUED | OUTPATIENT
Start: 2020-08-26 | End: 2020-08-28

## 2020-08-26 RX ORDER — DIPHENHYDRAMINE HCL 50 MG
25 CAPSULE ORAL EVERY 6 HOURS
Refills: 0 | Status: DISCONTINUED | OUTPATIENT
Start: 2020-08-26 | End: 2020-08-28

## 2020-08-26 RX ORDER — FAMOTIDINE 10 MG/ML
20 INJECTION INTRAVENOUS ONCE
Refills: 0 | Status: COMPLETED | OUTPATIENT
Start: 2020-08-26 | End: 2020-08-26

## 2020-08-26 RX ORDER — MAGNESIUM HYDROXIDE 400 MG/1
30 TABLET, CHEWABLE ORAL
Refills: 0 | Status: DISCONTINUED | OUTPATIENT
Start: 2020-08-26 | End: 2020-08-28

## 2020-08-26 RX ORDER — SIMETHICONE 80 MG/1
80 TABLET, CHEWABLE ORAL EVERY 4 HOURS
Refills: 0 | Status: DISCONTINUED | OUTPATIENT
Start: 2020-08-26 | End: 2020-08-28

## 2020-08-26 RX ORDER — SODIUM CHLORIDE 9 MG/ML
1000 INJECTION, SOLUTION INTRAVENOUS ONCE
Refills: 0 | Status: COMPLETED | OUTPATIENT
Start: 2020-08-26 | End: 2020-08-26

## 2020-08-26 RX ORDER — LANOLIN
1 OINTMENT (GRAM) TOPICAL EVERY 6 HOURS
Refills: 0 | Status: DISCONTINUED | OUTPATIENT
Start: 2020-08-26 | End: 2020-08-28

## 2020-08-26 RX ORDER — CITRIC ACID/SODIUM CITRATE 300-500 MG
30 SOLUTION, ORAL ORAL ONCE
Refills: 0 | Status: COMPLETED | OUTPATIENT
Start: 2020-08-26 | End: 2020-08-26

## 2020-08-26 RX ORDER — HEPARIN SODIUM 5000 [USP'U]/ML
5000 INJECTION INTRAVENOUS; SUBCUTANEOUS EVERY 12 HOURS
Refills: 0 | Status: DISCONTINUED | OUTPATIENT
Start: 2020-08-26 | End: 2020-08-28

## 2020-08-26 RX ORDER — OXYTOCIN 10 UNIT/ML
333.33 VIAL (ML) INJECTION
Qty: 20 | Refills: 0 | Status: DISCONTINUED | OUTPATIENT
Start: 2020-08-26 | End: 2020-08-27

## 2020-08-26 RX ORDER — HYDROMORPHONE HYDROCHLORIDE 2 MG/ML
1 INJECTION INTRAMUSCULAR; INTRAVENOUS; SUBCUTANEOUS
Refills: 0 | Status: DISCONTINUED | OUTPATIENT
Start: 2020-08-26 | End: 2020-08-27

## 2020-08-26 RX ORDER — IBUPROFEN 200 MG
600 TABLET ORAL EVERY 6 HOURS
Refills: 0 | Status: COMPLETED | OUTPATIENT
Start: 2020-08-26 | End: 2021-07-25

## 2020-08-26 RX ADMIN — Medication 10 MILLIGRAM(S): at 08:12

## 2020-08-26 RX ADMIN — Medication 975 MILLIGRAM(S): at 22:30

## 2020-08-26 RX ADMIN — Medication 30 MILLILITER(S): at 08:11

## 2020-08-26 RX ADMIN — SODIUM CHLORIDE 125 MILLILITER(S): 9 INJECTION, SOLUTION INTRAVENOUS at 08:12

## 2020-08-26 RX ADMIN — Medication 975 MILLIGRAM(S): at 21:50

## 2020-08-26 RX ADMIN — SODIUM CHLORIDE 2000 MILLILITER(S): 9 INJECTION, SOLUTION INTRAVENOUS at 08:12

## 2020-08-26 RX ADMIN — FAMOTIDINE 20 MILLIGRAM(S): 10 INJECTION INTRAVENOUS at 08:12

## 2020-08-26 RX ADMIN — Medication 1000 MILLIUNIT(S)/MIN: at 13:39

## 2020-08-26 RX ADMIN — HEPARIN SODIUM 5000 UNIT(S): 5000 INJECTION INTRAVENOUS; SUBCUTANEOUS at 18:47

## 2020-08-26 NOTE — DISCHARGE NOTE OB - MATERIALS PROVIDED
Immunization Record/Back To Sleep Handout/Shaken Baby Prevention Handout/Birth Certificate Instructions/[facility] Hearing Screen Program/Vaccinations/Guide to Postpartum Care/Discharge Medication Information for Patients and Families Pocket Guide/[facility]  Screening Program

## 2020-08-26 NOTE — OB PROVIDER H&P - NSHPPHYSICALEXAM_GEN_ALL_CORE
T(C): --  HR: 81 (08-26-20 @ 07:30) (81 - 81)  BP: 112/63 (08-26-20 @ 07:30) (112/63 - 112/63)    A&Ox3  Heart: RRR, S1&S2, no S3  Lungs: Clear bilateral to auscultation, good inspiratory /expiratory effort              no rhonchi, no rales  Abd: soft, Gravid, TOCO in place           +pfannenstial scar    EFM:  140 bpm/moderate variabilty/+accelerations/no decelerations/CAT 1  TOCO:  no UC  Ext:  FROM /bilateral  1+ LE edema   Neuro: grossly intact

## 2020-08-26 NOTE — OB PROVIDER DELIVERY SUMMARY - NSPROVIDERDELIVERYNOTE_OBGYN_ALL_OB_FT
Viable female nfant, apgar 9/9, wgt 7.4 #,3280 gms  delivered @11:05  cephalic presentation/adin breech presentation/single footling breech presentation /double footling breech presentation, clear copious fluid  Thick/Thin meconium fluid, noted  Vacuum assist x 1, loose nuchal x 1, body cord x 1, cord around feet  hysterotomy closed in single layer /2 layers  Diego, Fibrillar placed over hysterotomy, rectus layer, fascia layer  otherwise grossly nl uterus, tubes & ovaries    QBL:  EBL:  UOP:  IVF:  Dictation Number: Viable female infant, apgar 9/9, wgt 7.4 #,3280 gms  delivered @11:05  cephalic presentation, clear copious fluid  hysterotomy closed in single layer   anterior abdominal wall adhered to anterior surface of uterus. Evacele and Knuknit placed over area of removal of adhesion and rectus  otherwise grossly nl uterus, tubes & ovaries  QBL: 243  UOP: 150  IVF: 2300  Dictation Number: [id number]

## 2020-08-26 NOTE — DISCHARGE NOTE OB - CARE PLAN
Principal Discharge DX:	 delivery delivered  Goal:	normal postpartum course  Assessment and plan of treatment:	normal diet and activity  Secondary Diagnosis:	History of 2  sections

## 2020-08-26 NOTE — DISCHARGE NOTE OB - PATIENT PORTAL LINK FT
You can access the FollowMyHealth Patient Portal offered by [facility] by registering at the following website: [email or web address]. By joining [employer]’s FollowMyHealth portal, you will also be able to view your health information using other applications (apps) compatible with our system.

## 2020-08-26 NOTE — OB PROVIDER H&P - NSHPLABSRESULTS_GEN_ALL_CORE
Complete Blood Count (08.20.20 @ 15:00)    WBC Count: 19.65 K/uL    RBC Count: 4.05 M/uL    Hemoglobin: 11.0 g/dL    Hematocrit: 35.8 %    Platelet Count - Automated: 129 K/uL      Type + Screen (08.20.20 @ 14:37)    ABO Interpretation: A    Rh Interpretation: Positive    Antibody Screen: Negative

## 2020-08-26 NOTE — OB PROVIDER H&P - ASSESSMENT
Admit to [facility] MD-Service  Dx: scheduled rLTCS  Dx: GDMA2, h/o CLL  NPO  routine labs  EFM/TOCO  Bedside TLTAS  anesthesia consult  Karen Carey PAC  08-26-20 @ 07:43

## 2020-08-26 NOTE — PROGRESS NOTE ADULT - SUBJECTIVE AND OBJECTIVE BOX
Talked with patients hematologist, Dr Deirdre Franco, including CBC results.   This patient with CLL can have regional anesthesia without risk as per hematology.   Qasim Parisi MD

## 2020-08-26 NOTE — BRIEF OPERATIVE NOTE - OPERATION/FINDINGS
Viable female infant, apgar 9/9, wgt 7.4 #,3280 gms  delivered @11:05  cephalic presentation, clear copious fluid  hysterotomy closed in single layer   anterior abdominal wall adhered to anterior surface of uterus. Evacele and Knuknit placed over area of removal of adhesion and rectus  otherwise grossly nl uterus, tubes & ovaries  QBL: 243  UOP: 150  IVF: 2300  Dictation Number: [id number]

## 2020-08-26 NOTE — OB PROVIDER H&P - HISTORY OF PRESENT ILLNESS
36yo female   EDC 2020  presents@ 39. wks for scheduled rltcs .  +AP course GDMA2 controlled w/insulin, otherwise unremarkable. Denies SOB,fever, chills or cough.  Denies exposure to COVID-19, negative COVID-19 test(2020)  Denies VB,ROM or UCs today.  +FM

## 2020-08-26 NOTE — BRIEF OPERATIVE NOTE - NSICDXBRIEFPROCEDURE_GEN_ALL_CORE_FT
PROCEDURES:  Lysis, adhesions, peritoneal 26-Aug-2020 13:34:49  Karen Neff  Repeat  section 26-Aug-2020 13:33:55  Karen Neff

## 2020-08-26 NOTE — OB PROVIDER DELIVERY SUMMARY - NSLABORDELIVCOMPPROBLEMSOTHER_OBGYN_ALL_OB_FT
Spoke with Ivett wright,[facility] mental health intake. Awaiting call back. Attempting to get update on placement.    extensive adhesions abdominal wall to anterior surface

## 2020-08-26 NOTE — DISCHARGE NOTE OB - MEDICATION SUMMARY - MEDICATIONS TO STOP TAKING
I will STOP taking the medications listed below when I get home from the hospital:    Basaglar KwikPen 100 units/mL subcutaneous solution  -- 36 unit(s) subcutaneous once a day (at bedtime)

## 2020-08-27 LAB
APPEARANCE UR: CLEAR — SIGNIFICANT CHANGE UP
BACTERIA # UR AUTO: NEGATIVE — SIGNIFICANT CHANGE UP
BASOPHILS # BLD AUTO: 0.02 K/UL — SIGNIFICANT CHANGE UP (ref 0–0.2)
BASOPHILS NFR BLD AUTO: 0.1 % — SIGNIFICANT CHANGE UP (ref 0–2)
BILIRUB UR-MCNC: NEGATIVE — SIGNIFICANT CHANGE UP
BLOOD UR QL VISUAL: SIGNIFICANT CHANGE UP
COLOR SPEC: COLORLESS — SIGNIFICANT CHANGE UP
EOSINOPHIL # BLD AUTO: 0.04 K/UL — SIGNIFICANT CHANGE UP (ref 0–0.5)
EOSINOPHIL NFR BLD AUTO: 0.2 % — SIGNIFICANT CHANGE UP (ref 0–6)
GLUCOSE BLDC GLUCOMTR-MCNC: 85 MG/DL — SIGNIFICANT CHANGE UP (ref 70–99)
GLUCOSE UR-MCNC: NEGATIVE — SIGNIFICANT CHANGE UP
HCT VFR BLD CALC: 32 % — LOW (ref 34.5–45)
HGB BLD-MCNC: 10.2 G/DL — LOW (ref 11.5–15.5)
HYALINE CASTS # UR AUTO: NEGATIVE — SIGNIFICANT CHANGE UP
IMM GRANULOCYTES NFR BLD AUTO: 0.5 % — SIGNIFICANT CHANGE UP (ref 0–1.5)
KETONES UR-MCNC: NEGATIVE — SIGNIFICANT CHANGE UP
LEUKOCYTE ESTERASE UR-ACNC: NEGATIVE — SIGNIFICANT CHANGE UP
LG PLATELETS BLD QL AUTO: SLIGHT — SIGNIFICANT CHANGE UP
LYMPHOCYTES # BLD AUTO: 44.9 % — HIGH (ref 13–44)
LYMPHOCYTES # BLD AUTO: 7.95 K/UL — HIGH (ref 1–3.3)
MANUAL SMEAR VERIFICATION: SIGNIFICANT CHANGE UP
MCHC RBC-ENTMCNC: 27.3 PG — SIGNIFICANT CHANGE UP (ref 27–34)
MCHC RBC-ENTMCNC: 31.9 % — LOW (ref 32–36)
MCV RBC AUTO: 85.6 FL — SIGNIFICANT CHANGE UP (ref 80–100)
MONOCYTES # BLD AUTO: 1.74 K/UL — HIGH (ref 0–0.9)
MONOCYTES NFR BLD AUTO: 9.8 % — SIGNIFICANT CHANGE UP (ref 2–14)
MORPHOLOGY BLD-IMP: NORMAL — SIGNIFICANT CHANGE UP
NEUTROPHILS # BLD AUTO: 7.87 K/UL — HIGH (ref 1.8–7.4)
NEUTROPHILS NFR BLD AUTO: 44.5 % — SIGNIFICANT CHANGE UP (ref 43–77)
NITRITE UR-MCNC: NEGATIVE — SIGNIFICANT CHANGE UP
NRBC # FLD: 0 K/UL — SIGNIFICANT CHANGE UP (ref 0–0)
PH UR: 7 — SIGNIFICANT CHANGE UP (ref 5–8)
PLATELET # BLD AUTO: 93 K/UL — LOW (ref 150–400)
PLATELET COUNT - ESTIMATE: SIGNIFICANT CHANGE UP
PMV BLD: 10.9 FL — SIGNIFICANT CHANGE UP (ref 7–13)
PROT UR-MCNC: NEGATIVE — SIGNIFICANT CHANGE UP
RBC # BLD: 3.74 M/UL — LOW (ref 3.8–5.2)
RBC # FLD: 14 % — SIGNIFICANT CHANGE UP (ref 10.3–14.5)
RBC CASTS # UR COMP ASSIST: SIGNIFICANT CHANGE UP (ref 0–?)
SP GR SPEC: 1.01 — SIGNIFICANT CHANGE UP (ref 1–1.04)
SQUAMOUS # UR AUTO: SIGNIFICANT CHANGE UP
UROBILINOGEN FLD QL: NORMAL — SIGNIFICANT CHANGE UP
WBC # BLD: 17.7 K/UL — HIGH (ref 3.8–10.5)
WBC # FLD AUTO: 17.7 K/UL — HIGH (ref 3.8–10.5)
WBC UR QL: SIGNIFICANT CHANGE UP (ref 0–?)

## 2020-08-27 RX ORDER — SENNA PLUS 8.6 MG/1
1 TABLET ORAL
Refills: 0 | Status: DISCONTINUED | OUTPATIENT
Start: 2020-08-27 | End: 2020-08-28

## 2020-08-27 RX ORDER — FERROUS SULFATE 325(65) MG
325 TABLET ORAL DAILY
Refills: 0 | Status: DISCONTINUED | OUTPATIENT
Start: 2020-08-27 | End: 2020-08-28

## 2020-08-27 RX ORDER — OXYCODONE HYDROCHLORIDE 5 MG/1
5 TABLET ORAL
Refills: 0 | Status: DISCONTINUED | OUTPATIENT
Start: 2020-08-27 | End: 2020-08-28

## 2020-08-27 RX ORDER — IBUPROFEN 200 MG
600 TABLET ORAL EVERY 6 HOURS
Refills: 0 | Status: DISCONTINUED | OUTPATIENT
Start: 2020-08-27 | End: 2020-08-28

## 2020-08-27 RX ADMIN — Medication 975 MILLIGRAM(S): at 17:25

## 2020-08-27 RX ADMIN — Medication 975 MILLIGRAM(S): at 18:08

## 2020-08-27 RX ADMIN — MAGNESIUM HYDROXIDE 30 MILLILITER(S): 400 TABLET, CHEWABLE ORAL at 10:02

## 2020-08-27 RX ADMIN — Medication 975 MILLIGRAM(S): at 11:44

## 2020-08-27 RX ADMIN — Medication 600 MILLIGRAM(S): at 21:30

## 2020-08-27 RX ADMIN — OXYCODONE HYDROCHLORIDE 5 MILLIGRAM(S): 5 TABLET ORAL at 22:45

## 2020-08-27 RX ADMIN — HEPARIN SODIUM 5000 UNIT(S): 5000 INJECTION INTRAVENOUS; SUBCUTANEOUS at 17:25

## 2020-08-27 RX ADMIN — SENNA PLUS 1 TABLET(S): 8.6 TABLET ORAL at 17:26

## 2020-08-27 RX ADMIN — SIMETHICONE 80 MILLIGRAM(S): 80 TABLET, CHEWABLE ORAL at 22:01

## 2020-08-27 RX ADMIN — Medication 975 MILLIGRAM(S): at 12:40

## 2020-08-27 RX ADMIN — OXYCODONE HYDROCHLORIDE 5 MILLIGRAM(S): 5 TABLET ORAL at 19:45

## 2020-08-27 RX ADMIN — Medication 325 MILLIGRAM(S): at 11:45

## 2020-08-27 RX ADMIN — Medication 30 MILLIGRAM(S): at 05:06

## 2020-08-27 RX ADMIN — OXYCODONE HYDROCHLORIDE 5 MILLIGRAM(S): 5 TABLET ORAL at 22:01

## 2020-08-27 RX ADMIN — Medication 30 MILLIGRAM(S): at 00:03

## 2020-08-27 RX ADMIN — Medication 30 MILLIGRAM(S): at 06:00

## 2020-08-27 RX ADMIN — HEPARIN SODIUM 5000 UNIT(S): 5000 INJECTION INTRAVENOUS; SUBCUTANEOUS at 05:06

## 2020-08-27 RX ADMIN — Medication 600 MILLIGRAM(S): at 14:43

## 2020-08-27 RX ADMIN — Medication 1 TABLET(S): at 11:45

## 2020-08-27 RX ADMIN — Medication 600 MILLIGRAM(S): at 20:50

## 2020-08-27 RX ADMIN — Medication 600 MILLIGRAM(S): at 15:30

## 2020-08-27 RX ADMIN — Medication 30 MILLIGRAM(S): at 00:04

## 2020-08-27 RX ADMIN — OXYCODONE HYDROCHLORIDE 5 MILLIGRAM(S): 5 TABLET ORAL at 19:07

## 2020-08-27 NOTE — PROGRESS NOTE ADULT - PROBLEM SELECTOR PLAN 1
- Continue regular diet.  - Increase ambulation.  -  Continue motrin, tylenol, oxycodone PRN for pain control.      Yecenia Maldonado, PGY1

## 2020-08-27 NOTE — PROVIDER CONTACT NOTE (OTHER) - ASSESSMENT
Patient states her pain is in her lower back on the sides and also around to the abdomen. Rates pain at 10/10. oxycodone given at 1907 and heat pads given. says pain is worse with movement. Patient denies dysuria or urinary frequency. Scant lochia, passing flatus. Motrin was given at 1443, tylenol at 1725. Milk of mag at 1002, senna at 1725.

## 2020-08-27 NOTE — PROGRESS NOTE ADULT - SUBJECTIVE AND OBJECTIVE BOX
OB Progress Note:  Delivery, POD#1    S: 34yo POD#1 s/p pLTCS . Her pain is well controlled. She is tolerating a regular diet and passing flatus. Denies N/V. Denies CP/SOB/lightheadedness/dizziness. Endorses light vaginal bleeding, less than one pad per hour. She is ambulating without difficulty. Voiding spontaneously.     O:   Vital Signs Last 24 Hrs  T(C): 37.1 (27 Aug 2020 05:00), Max: 37.1 (27 Aug 2020 01:00)  T(F): 98.8 (27 Aug 2020 05:00), Max: 98.8 (27 Aug 2020 01:00)  HR: 73 (27 Aug 2020 05:00) (52 - 76)  BP: 111/61 (27 Aug 2020 05:00) (90/55 - 120/66)  BP(mean): 80 (26 Aug 2020 18:30) (59 - 82)  RR: 18 (27 Aug 2020 05:) (13 - 21)  SpO2: 99% (27 Aug 2020 05:00) (97% - 100%)    Labs:  Blood type: A Positive  Rubella IgG: RPR: Negative                          10.2<L>   17.70<H> >-----------< 93<L>    (  @ 06:00 )             32.0<L>                        12.3   23.78<H> >-----------< 127<L>    (  @ 07:20 )             39.6                  PE:  General: NAD  Heart: extremities well-perfused  Lungs: breathing comfortably  Abdomen: Mildly distended, appropriately tender, incision c/d/i.  Extremities: No erythema, no pitting edema

## 2020-08-28 VITALS
OXYGEN SATURATION: 97 % | SYSTOLIC BLOOD PRESSURE: 125 MMHG | DIASTOLIC BLOOD PRESSURE: 67 MMHG | RESPIRATION RATE: 19 BRPM | HEART RATE: 86 BPM | TEMPERATURE: 98 F

## 2020-08-28 LAB
CULTURE RESULTS: SIGNIFICANT CHANGE UP
SPECIMEN SOURCE: SIGNIFICANT CHANGE UP

## 2020-08-28 RX ORDER — ACETAMINOPHEN 500 MG
3 TABLET ORAL
Qty: 0 | Refills: 0 | DISCHARGE
Start: 2020-08-28

## 2020-08-28 RX ORDER — FERROUS SULFATE 325(65) MG
1 TABLET ORAL
Qty: 0 | Refills: 0 | DISCHARGE

## 2020-08-28 RX ORDER — INSULIN GLARGINE 100 [IU]/ML
36 INJECTION, SOLUTION SUBCUTANEOUS
Qty: 0 | Refills: 0 | DISCHARGE

## 2020-08-28 RX ORDER — IBUPROFEN 200 MG
1 TABLET ORAL
Qty: 0 | Refills: 0 | DISCHARGE
Start: 2020-08-28

## 2020-08-28 RX ADMIN — SIMETHICONE 80 MILLIGRAM(S): 80 TABLET, CHEWABLE ORAL at 06:01

## 2020-08-28 RX ADMIN — HEPARIN SODIUM 5000 UNIT(S): 5000 INJECTION INTRAVENOUS; SUBCUTANEOUS at 06:01

## 2020-08-28 RX ADMIN — Medication 975 MILLIGRAM(S): at 00:30

## 2020-08-28 RX ADMIN — Medication 975 MILLIGRAM(S): at 12:30

## 2020-08-28 RX ADMIN — Medication 600 MILLIGRAM(S): at 03:10

## 2020-08-28 RX ADMIN — Medication 600 MILLIGRAM(S): at 08:41

## 2020-08-28 RX ADMIN — MAGNESIUM HYDROXIDE 30 MILLILITER(S): 400 TABLET, CHEWABLE ORAL at 08:42

## 2020-08-28 RX ADMIN — Medication 975 MILLIGRAM(S): at 06:39

## 2020-08-28 RX ADMIN — Medication 600 MILLIGRAM(S): at 09:22

## 2020-08-28 RX ADMIN — Medication 975 MILLIGRAM(S): at 06:01

## 2020-08-28 RX ADMIN — Medication 975 MILLIGRAM(S): at 00:04

## 2020-08-28 RX ADMIN — OXYCODONE HYDROCHLORIDE 5 MILLIGRAM(S): 5 TABLET ORAL at 06:40

## 2020-08-28 RX ADMIN — Medication 600 MILLIGRAM(S): at 02:35

## 2020-08-28 RX ADMIN — OXYCODONE HYDROCHLORIDE 5 MILLIGRAM(S): 5 TABLET ORAL at 06:02

## 2020-08-28 RX ADMIN — Medication 975 MILLIGRAM(S): at 11:38

## 2020-08-28 NOTE — PROGRESS NOTE ADULT - SUBJECTIVE AND OBJECTIVE BOX
SUBJECTIVE:    Pain: well controlled  Complaints: none    MILESTONES:    Alert and oriented x 3  [ x ]  Out of bed/ ambulating. [ x ]  Flatus: [x  ]  Postive [  ] Negative   Bowel movement  [  ] Positive [x  ] Negative   Voiding [ x ] Due to void [  ]   Diet: Regular [ x ]  Clears [  ]  NPO [  ]  Infant feeding:  Breast [ x ]   Bottle [  ]  Both [  ]  Feeding related inssues and/or concerns: none      OBJECTIVE:  T(C): 36.7 (20 @ 06:38), Max: 37.1 (20 @ 13:45)  HR: 74 (20 @ 06:38) (73 - 84)  BP: 115/60 (20 @ 06:38) (111/60 - 127/59)  RR: 18 (20 @ 06:38) (18 - 18)  SpO2: 97% (20 @ 06:38) (97% - 100%)  Wt(kg): --                        10.2   17.70 )-----------( 93       ( 27 Aug 2020 06:00 )             32.0         MEDICATIONS  (STANDING):  acetaminophen   Tablet .. 975 milliGRAM(s) Oral <User Schedule>  diphtheria/tetanus/pertussis (acellular) Vaccine (ADAcel) 0.5 milliLiter(s) IntraMuscular once  ferrous    sulfate 325 milliGRAM(s) Oral daily  heparin   Injectable 5000 Unit(s) SubCutaneous every 12 hours  ibuprofen  Tablet. 600 milliGRAM(s) Oral every 6 hours  prenatal multivitamin 1 Tablet(s) Oral daily    MEDICATIONS  (PRN):  diphenhydrAMINE 25 milliGRAM(s) Oral every 6 hours PRN Itching  lanolin Ointment 1 Application(s) Topical every 6 hours PRN Sore Nipples  magnesium hydroxide Suspension 30 milliLiter(s) Oral two times a day PRN Constipation  oxyCODONE    IR 5 milliGRAM(s) Oral once PRN Moderate to Severe Pain (4-10)  oxyCODONE    IR 5 milliGRAM(s) Oral every 3 hours PRN Moderate to Severe Pain (4-10)  senna 1 Tablet(s) Oral two times a day PRN Constipation  simethicone 80 milliGRAM(s) Chew every 4 hours PRN Gas        ASSESSMENT:  35y  y/o 3P  3  PO Day#  2      Delivery: Primary [  ]    Repeat x]                                       Indication of procedure:  Condition: Stable  Past Medical History significant for: HPI:  34yo female   EDC 2020  presents@ 39. wks for scheduled rltcs .  +AP course GDMA2 controlled w/insulin, otherwise unremarkable. Denies SOB,fever, chills or cough.  Denies exposure to COVID-19, negative COVID-19 test(2020)  Denies VB,ROM or UCs today.  +FM (26 Aug 2020 07:35)    Current Issues: none  Heart:    RRR                          Lungs: clear  Breasts:  Soft [x  ]   Engorged [  ]  Abdomen: Soft [ x ] , distended [  ] nontender [x  ]   Bowel sounds :  Present [x  ]  Absent [  ]   Fundus firm [x  ]  Boggy [  ]  Abdominal incision: Clean, dry and intact [x  ]  Staples [  ] Steri Strips [ x ] Dermabond [   Patient wearing abdominal binder for support.  Vaginal: Lochia:  Heavy [  ]  Moderate [  ]   Scant [ x ]  Extremities: Edema [ 1+ ] negative Georges's Sign [x  ] Nontender Zac  [x  ] Positive pedal pulses [ x ]  Other relevant physical exam findings: none      PLAN:  Plan: Increase ambulation, analgesia PRN and pain medication protocol standing oxycodone, ibuprofen and acetaminophen.  Diet: Regular diet  Continue routine post-operative and postpartum care.     Discharge Planning [ x ]  Consults:   Social Work [  ] Lac[place] [  ] Other [  ]

## 2020-08-28 NOTE — PROGRESS NOTE ADULT - ATTENDING COMMENTS
Pt seen and examined  Feels well. Desires dc home  Agree with above exam
Pt seen and examined and agree with above assessment and plan.

## 2020-09-15 ENCOUNTER — APPOINTMENT (OUTPATIENT)
Dept: OBGYN | Facility: CLINIC | Age: 35
End: 2020-09-15
Payer: MEDICAID

## 2020-09-15 PROCEDURE — 0503F POSTPARTUM CARE VISIT: CPT

## 2020-09-30 ENCOUNTER — OUTPATIENT (OUTPATIENT)
Dept: OUTPATIENT SERVICES | Facility: HOSPITAL | Age: 35
LOS: 1 days | Discharge: HOME | End: 2020-09-30

## 2020-09-30 DIAGNOSIS — C91.10 CHRONIC LYMPHOCYTIC LEUKEMIA OF B-CELL TYPE NOT HAVING ACHIEVED REMISSION: ICD-10-CM

## 2020-09-30 DIAGNOSIS — Z98.891 HISTORY OF UTERINE SCAR FROM PREVIOUS SURGERY: Chronic | ICD-10-CM

## 2020-10-26 ENCOUNTER — LABORATORY RESULT (OUTPATIENT)
Age: 35
End: 2020-10-26

## 2020-10-26 ENCOUNTER — RESULT REVIEW (OUTPATIENT)
Age: 35
End: 2020-10-26

## 2020-10-26 ENCOUNTER — APPOINTMENT (OUTPATIENT)
Dept: HEMATOLOGY ONCOLOGY | Facility: CLINIC | Age: 35
End: 2020-10-26
Payer: MEDICAID

## 2020-10-26 ENCOUNTER — APPOINTMENT (OUTPATIENT)
Dept: OBGYN | Facility: CLINIC | Age: 35
End: 2020-10-26
Payer: MEDICAID

## 2020-10-26 VITALS
SYSTOLIC BLOOD PRESSURE: 108 MMHG | BODY MASS INDEX: 26.03 KG/M2 | TEMPERATURE: 97.3 F | HEIGHT: 64.57 IN | WEIGHT: 154.32 LBS | DIASTOLIC BLOOD PRESSURE: 71 MMHG | HEART RATE: 74 BPM | OXYGEN SATURATION: 98 % | RESPIRATION RATE: 16 BRPM

## 2020-10-26 LAB
BASOPHILS # BLD AUTO: 0 K/UL — SIGNIFICANT CHANGE UP (ref 0–0.2)
BASOPHILS NFR BLD AUTO: 0 % — SIGNIFICANT CHANGE UP (ref 0–2)
EOSINOPHIL # BLD AUTO: 0.16 K/UL — SIGNIFICANT CHANGE UP (ref 0–0.5)
EOSINOPHIL NFR BLD AUTO: 1 % — SIGNIFICANT CHANGE UP (ref 0–6)
HCT VFR BLD CALC: 38.9 % — SIGNIFICANT CHANGE UP (ref 34.5–45)
HGB BLD-MCNC: 12.6 G/DL — SIGNIFICANT CHANGE UP (ref 11.5–15.5)
LYMPHOCYTES # BLD AUTO: 59 % — HIGH (ref 13–44)
LYMPHOCYTES # BLD AUTO: 9.19 K/UL — HIGH (ref 1–3.3)
MCHC RBC-ENTMCNC: 27.1 PG — SIGNIFICANT CHANGE UP (ref 27–34)
MCHC RBC-ENTMCNC: 32.4 G/DL — SIGNIFICANT CHANGE UP (ref 32–36)
MCV RBC AUTO: 83.7 FL — SIGNIFICANT CHANGE UP (ref 80–100)
MONOCYTES # BLD AUTO: 0.62 K/UL — SIGNIFICANT CHANGE UP (ref 0–0.9)
MONOCYTES NFR BLD AUTO: 4 % — SIGNIFICANT CHANGE UP (ref 2–14)
NEUTROPHILS # BLD AUTO: 5.61 K/UL — SIGNIFICANT CHANGE UP (ref 1.8–7.4)
NEUTROPHILS NFR BLD AUTO: 36 % — LOW (ref 43–77)
NRBC # BLD: 0 /100 — SIGNIFICANT CHANGE UP (ref 0–0)
NRBC # BLD: SIGNIFICANT CHANGE UP /100 WBCS (ref 0–0)
PLAT MORPH BLD: NORMAL — SIGNIFICANT CHANGE UP
PLATELET # BLD AUTO: 140 K/UL — LOW (ref 150–400)
RBC # BLD: 4.65 M/UL — SIGNIFICANT CHANGE UP (ref 3.8–5.2)
RBC # FLD: 12.4 % — SIGNIFICANT CHANGE UP (ref 10.3–14.5)
RBC BLD AUTO: SIGNIFICANT CHANGE UP
WBC # BLD: 15.57 K/UL — HIGH (ref 3.8–10.5)
WBC # FLD AUTO: 15.57 K/UL — HIGH (ref 3.8–10.5)

## 2020-10-26 PROCEDURE — 99214 OFFICE O/P EST MOD 30 MIN: CPT

## 2020-10-26 PROCEDURE — 0502F SUBSEQUENT PRENATAL CARE: CPT

## 2020-10-26 PROCEDURE — 99072 ADDL SUPL MATRL&STAF TM PHE: CPT

## 2020-10-27 NOTE — ASSESSMENT
[FreeTextEntry1] : This is a 35 year old female with lymphocytosis, bone marrow biopsy is consistent with chronic lymphocytic leukemia.  CT of her neck/chest with small lymph nodes, likely due to CLL- stage I. 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 \par Her WBC is stable, Hg has normalized (post pregnancy), mildly low plt.  \par No indications for treatment- indications for treatment including cytopenias, bulky disease- lymphadenopathy >10 cm, spleen >6 cm below the costal margin.  \par Uncertain etiology of her arthralgias- will check an ESR/CRP, RF/KRISTIE.  Rheumatology evaluation. \par Follow up in 3 months.  She will call if her symptoms worsen.\par

## 2020-10-27 NOTE — PHYSICAL EXAM
[Fully active, able to carry on all pre-disease performance without restriction] : Status 0 - Fully active, able to carry on all pre-disease performance without restriction [Normal] : affect appropriate [de-identified] : no palpable cervical/axillary/inguinal lymph nodes

## 2020-10-27 NOTE — HISTORY OF PRESENT ILLNESS
[de-identified] : CLL- stage 0.  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 \par \par She was found to have elevated white blood cell count on routine blood work.  CBC on 7/1- WBC 11.2 ALC 7750 Hg 12.0 Plt 192.  She was seen by Dr. Subha Charles.   Peripheral blood flow cytometry was sent 8/24/17- revealed a population of B cells, positive for CD19, CD20,negative for CD5 and CD10 with no light chain restriction seen.  Labs sent reveal a normal SPEP/LAINE, k/l ration 1.71, B2 microglobulin 1.41, IgG 889, IgM 87,ESR<2.  She underwent a bone marrow biopsy on 9/26 which revealed a normocellular marrow, trilineage hematopoiesis, minimal involvement with CLL (10-15%)- IHC positive CD19, CD20, CD5, CD23.  CLL FISH revealed deletion 13, no evidence of del 11 or 17.  IGVH is mutated, CD38 is not expressed.  For some reason FISH for bcr-abl was sent- negative along with CALR which was also normal.  She had a PET/CT completed on 9/7 that revealed a 5 mm nodule in her LLL, otherwise no lymphadenopathy or splenomegaly.  She denies any previous medical history. \par \par Labs from 7/31/17 show Monos 0.8, EOS 0.4%, Lymph 69.4%, Hgb 12.0, Polys 29.0, HCT 38.1, WBC 11.2, Plt 192K. Labs from 6/19/16 show Monos 3.5, EOS 0.5%, Lymph 56.7%, Hgb 12.0, Polys 38.9, HCT 36.2, WBC 11.3, Plt 192K, Cr 0.7. \par \par \par \par \par \par  [de-identified] : Patient presents for a follow up appointment.  She delivered her child, no complications.  She states for the last couple of weeks she has developed increased arthralgias, in her fingers, wrists, shoulders, knees, back.  She has no recent infections, no fever/chills, no sweat.  Her appetite remains good.

## 2020-11-02 ENCOUNTER — APPOINTMENT (OUTPATIENT)
Dept: OBGYN | Facility: CLINIC | Age: 35
End: 2020-11-02
Payer: MEDICAID

## 2020-11-02 PROCEDURE — 58300 INSERT INTRAUTERINE DEVICE: CPT

## 2020-11-02 PROCEDURE — 99072 ADDL SUPL MATRL&STAF TM PHE: CPT

## 2020-11-03 ENCOUNTER — APPOINTMENT (OUTPATIENT)
Dept: RHEUMATOLOGY | Facility: CLINIC | Age: 35
End: 2020-11-03
Payer: MEDICAID

## 2020-11-03 ENCOUNTER — NON-APPOINTMENT (OUTPATIENT)
Age: 35
End: 2020-11-03

## 2020-11-03 VITALS
HEIGHT: 63.58 IN | SYSTOLIC BLOOD PRESSURE: 126 MMHG | TEMPERATURE: 98 F | HEART RATE: 69 BPM | WEIGHT: 155.52 LBS | BODY MASS INDEX: 27.21 KG/M2 | OXYGEN SATURATION: 97 % | DIASTOLIC BLOOD PRESSURE: 65 MMHG

## 2020-11-03 DIAGNOSIS — M25.552 PAIN IN LEFT HIP: ICD-10-CM

## 2020-11-03 DIAGNOSIS — M25.50 PAIN IN UNSPECIFIED JOINT: ICD-10-CM

## 2020-11-03 PROCEDURE — 99072 ADDL SUPL MATRL&STAF TM PHE: CPT

## 2020-11-03 PROCEDURE — 99203 OFFICE O/P NEW LOW 30 MIN: CPT

## 2020-11-03 NOTE — DATA REVIEWED
[FreeTextEntry1] : blood work done with Dr Franco reviewed\par no inflammation\par negative testing for lupus and rheumatoid\par X-rays of right shoulder and wrist in 2019 normal

## 2020-11-03 NOTE — CONSULT LETTER
[Dear  ___] : Dear  [unfilled], [Consult Letter:] : I had the pleasure of evaluating your patient, [unfilled]. [Please see my note below.] : Please see my note below. [Sincerely,] : Sincerely, [FreeTextEntry2] : Dr Salvador Gilmore [FreeTextEntry3] : Cristine Escobedo MD\par , [facility]\par Division of Rheumatology\par

## 2020-11-03 NOTE — REVIEW OF SYSTEMS
[Fever] : no fever [Chills] : no chills [As Noted in HPI] : as noted in HPI [Negative] : Neurological

## 2020-11-03 NOTE — HISTORY OF PRESENT ILLNESS
[FreeTextEntry1] : 34 yo W, referred for evaluation of joint pains.\par \par =Started around 2 months ago after giving birth.\par She denies prior history of joint pains.\par At this time, she reports daily pain at the shoulders, neck and elbows that only occurs with movement/activity. No pain in the morning, no morning stiffness\par no associated joint swelling redness or warmth\par she is not taking anything for pain.\par She denies any dry eyes or mouth, no oral or nasal ulcers, no rashes.\par \par Other medical history \par =CLL, follows with hematology Dr Franco- no current indication for treatment\par

## 2020-11-03 NOTE — PHYSICAL EXAM
[General Appearance - Alert] : alert [General Appearance - In No Acute Distress] : in no acute distress [Oriented To Time, Place, And Person] : oriented to person, place, and time [Sclera] : the sclera and conjunctiva were normal [Hearing Threshold Finger Rub Not [place]] : hearing was normal [Auscultation Breath Sounds / Voice Sounds] : lungs were clear to auscultation bilaterally [Heart Sounds] : normal S1 and S2 [Edema] : there was no peripheral edema [Abdomen Soft] : soft [Abdomen Tenderness] : non-tender [No Spinal Tenderness] : no spinal tenderness [FreeTextEntry1] : painful ROM at left hip, rest of MSK exam was normal  [] : no rash [No Focal Deficits] : no focal deficits

## 2020-11-19 NOTE — OB PST NOTE - NS PRO ABUSE SCREEN SUSPICION NEGLECT YN
HPI: 72-year-old female who comes to clinic complaining of body aches, fatigue that has been going on for 4 days.  Patient states that she went camping with her boyfriend and since then she has been noticing the symptoms.  She denies any fevers, chills, shortness of breath lightheadedness, dizziness, chest pain, nausea, vomiting, diarrhea or any other associated symptoms.  She denies any known exposure to COVID-19.  She has been doing well otherwise.    Patient was seen and examined wearing full PPE      Review of Systems   Constitutional: Positive for malaise/fatigue. Negative for chills and fever.   HENT: Negative for congestion and sore throat.    Eyes: Negative for blurred vision.   Respiratory: Negative for cough and shortness of breath.    Cardiovascular: Negative for chest pain and palpitations.   Gastrointestinal: Negative for abdominal pain, diarrhea, nausea and vomiting.   Musculoskeletal: Positive for myalgias.   Skin: Negative for rash.   Neurological: Negative for dizziness and headaches.      10 point review of systems otherwise negative.           PAST MEDICAL HX:    Essential (primary) hypertension                              Myocardial infarction (CMS/HCC)                               Family history was noncontributory to the present illness.      Social History     Tobacco Use   • Smoking status: Never Smoker   • Smokeless tobacco: Never Used   Substance Use Topics   • Alcohol use: Not on file   • Drug use: Never        Vitals:    11/19/20 1029   BP: 119/76   Pulse: 74   Resp: 16   Temp: 97.4 °F (36.3 °C)        Physical Exam   Constitutional: She is well-developed, well-nourished, and in no distress.   HENT:   Head: Normocephalic and atraumatic.   Right Ear: External ear normal.   Left Ear: External ear normal.   Mouth/Throat: Oropharynx is clear and moist.   Eyes: Conjunctivae are normal.   Neck: Normal range of motion.   Cardiovascular: Normal rate, regular rhythm and normal heart sounds.    Pulmonary/Chest: Effort normal and breath sounds normal. No respiratory distress.   Abdominal: She exhibits no distension.   Musculoskeletal: Normal range of motion.   Neurological: She is alert. Gait normal.   Skin: No rash noted.   Psychiatric: Mood and affect normal.                  MDM: Due to patient's symptoms, patient was tested for COVID-19.  Patient otherwise appears in no distress.  Patient does not appear septic or toxic.  They were advised to self quarantine until their symptoms resolve and there results come back.  They were advised to follow-up with their PCP in 3 to 5 days for recheck or go to the ED if symptoms worsen.  Patient verbalized understanding.      ED Diagnoses        Final diagnoses    Suspected COVID-19 virus infection          Chronic nonintractable headache, unspecified headache type     Associated Orders          SARS-COV-2 RNA, QUALITATIVE, REAL TIME PCR            New Prescriptions    No medications on file         Disposition: Home         no

## 2020-11-30 ENCOUNTER — APPOINTMENT (OUTPATIENT)
Dept: ANTEPARTUM | Facility: CLINIC | Age: 35
End: 2020-11-30
Payer: MEDICAID

## 2020-11-30 PROCEDURE — 76830 TRANSVAGINAL US NON-OB: CPT

## 2020-11-30 PROCEDURE — 99072 ADDL SUPL MATRL&STAF TM PHE: CPT

## 2020-11-30 PROCEDURE — 76856 US EXAM PELVIC COMPLETE: CPT

## 2021-01-17 LAB — SARS-COV-2 N GENE NPH QL NAA+PROBE: NOT DETECTED

## 2021-01-22 ENCOUNTER — OUTPATIENT (OUTPATIENT)
Dept: OUTPATIENT SERVICES | Facility: HOSPITAL | Age: 36
LOS: 1 days | Discharge: HOME | End: 2021-01-22

## 2021-01-22 DIAGNOSIS — C91.10 CHRONIC LYMPHOCYTIC LEUKEMIA OF B-CELL TYPE NOT HAVING ACHIEVED REMISSION: ICD-10-CM

## 2021-01-22 DIAGNOSIS — Z98.891 HISTORY OF UTERINE SCAR FROM PREVIOUS SURGERY: Chronic | ICD-10-CM

## 2021-03-14 ENCOUNTER — OUTPATIENT (OUTPATIENT)
Dept: OUTPATIENT SERVICES | Facility: HOSPITAL | Age: 36
LOS: 1 days | Discharge: HOME | End: 2021-03-14

## 2021-03-14 DIAGNOSIS — C91.10 CHRONIC LYMPHOCYTIC LEUKEMIA OF B-CELL TYPE NOT HAVING ACHIEVED REMISSION: ICD-10-CM

## 2021-03-14 DIAGNOSIS — Z98.891 HISTORY OF UTERINE SCAR FROM PREVIOUS SURGERY: Chronic | ICD-10-CM

## 2021-03-18 ENCOUNTER — RESULT REVIEW (OUTPATIENT)
Age: 36
End: 2021-03-18

## 2021-03-18 ENCOUNTER — APPOINTMENT (OUTPATIENT)
Dept: HEMATOLOGY ONCOLOGY | Facility: CLINIC | Age: 36
End: 2021-03-18
Payer: MEDICAID

## 2021-03-18 VITALS
TEMPERATURE: 97.3 F | BODY MASS INDEX: 26.37 KG/M2 | DIASTOLIC BLOOD PRESSURE: 75 MMHG | HEART RATE: 75 BPM | OXYGEN SATURATION: 99 % | WEIGHT: 152.56 LBS | SYSTOLIC BLOOD PRESSURE: 122 MMHG | RESPIRATION RATE: 17 BRPM | HEIGHT: 63.62 IN

## 2021-03-18 LAB
BASOPHILS # BLD AUTO: 0.15 K/UL — SIGNIFICANT CHANGE UP (ref 0–0.2)
BASOPHILS NFR BLD AUTO: 1 % — SIGNIFICANT CHANGE UP (ref 0–2)
EOSINOPHIL # BLD AUTO: 0 K/UL — SIGNIFICANT CHANGE UP (ref 0–0.5)
EOSINOPHIL NFR BLD AUTO: 0 % — SIGNIFICANT CHANGE UP (ref 0–6)
HCT VFR BLD CALC: 35.1 % — SIGNIFICANT CHANGE UP (ref 34.5–45)
HGB BLD-MCNC: 11.6 G/DL — SIGNIFICANT CHANGE UP (ref 11.5–15.5)
LYMPHOCYTES # BLD AUTO: 55 % — HIGH (ref 13–44)
LYMPHOCYTES # BLD AUTO: 8.31 K/UL — HIGH (ref 1–3.3)
MCHC RBC-ENTMCNC: 27 PG — SIGNIFICANT CHANGE UP (ref 27–34)
MCHC RBC-ENTMCNC: 33 G/DL — SIGNIFICANT CHANGE UP (ref 32–36)
MCV RBC AUTO: 81.6 FL — SIGNIFICANT CHANGE UP (ref 80–100)
MONOCYTES # BLD AUTO: 1.21 K/UL — HIGH (ref 0–0.9)
MONOCYTES NFR BLD AUTO: 8 % — SIGNIFICANT CHANGE UP (ref 2–14)
NEUTROPHILS # BLD AUTO: 5.44 K/UL — SIGNIFICANT CHANGE UP (ref 1.8–7.4)
NEUTROPHILS NFR BLD AUTO: 36 % — LOW (ref 43–77)
NRBC # BLD: 0 /100 — SIGNIFICANT CHANGE UP (ref 0–0)
NRBC # BLD: SIGNIFICANT CHANGE UP /100 WBCS (ref 0–0)
PLAT MORPH BLD: NORMAL — SIGNIFICANT CHANGE UP
PLATELET # BLD AUTO: 121 K/UL — LOW (ref 150–400)
RBC # BLD: 4.3 M/UL — SIGNIFICANT CHANGE UP (ref 3.8–5.2)
RBC # FLD: 12.5 % — SIGNIFICANT CHANGE UP (ref 10.3–14.5)
RBC BLD AUTO: SIGNIFICANT CHANGE UP
SMUDGE CELLS # BLD: PRESENT — SIGNIFICANT CHANGE UP
WBC # BLD: 15.1 K/UL — HIGH (ref 3.8–10.5)
WBC # FLD AUTO: 15.1 K/UL — HIGH (ref 3.8–10.5)

## 2021-03-18 PROCEDURE — 99213 OFFICE O/P EST LOW 20 MIN: CPT

## 2021-03-18 PROCEDURE — 99072 ADDL SUPL MATRL&STAF TM PHE: CPT

## 2021-03-18 NOTE — PHYSICAL EXAM
[Fully active, able to carry on all pre-disease performance without restriction] : Status 0 - Fully active, able to carry on all pre-disease performance without restriction [Normal] : affect appropriate [de-identified] : no palpable cervical/axillary/inguinal lymph nodes

## 2021-03-18 NOTE — HISTORY OF PRESENT ILLNESS
[de-identified] : CLL- stage 0.  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 \par \par She was found to have elevated white blood cell count on routine blood work.  CBC on 7/1- WBC 11.2 ALC 7750 Hg 12.0 Plt 192.  She was seen by Dr. Subha Charles.   Peripheral blood flow cytometry was sent 8/24/17- revealed a population of B cells, positive for CD19, CD20,negative for CD5 and CD10 with no light chain restriction seen.  Labs sent reveal a normal SPEP/LAINE, k/l ration 1.71, B2 microglobulin 1.41, IgG 889, IgM 87,ESR<2.  She underwent a bone marrow biopsy on 9/26 which revealed a normocellular marrow, trilineage hematopoiesis, minimal involvement with CLL (10-15%)- IHC positive CD19, CD20, CD5, CD23.  CLL FISH revealed deletion 13, no evidence of del 11 or 17.  IGVH is mutated, CD38 is not expressed.  For some reason FISH for bcr-abl was sent- negative along with CALR which was also normal.  She had a PET/CT completed on 9/7 that revealed a 5 mm nodule in her LLL, otherwise no lymphadenopathy or splenomegaly.  She denies any previous medical history. \par \par Labs from 7/31/17 show Monos 0.8, EOS 0.4%, Lymph 69.4%, Hgb 12.0, Polys 29.0, HCT 38.1, WBC 11.2, Plt 192K. Labs from 6/19/16 show Monos 3.5, EOS 0.5%, Lymph 56.7%, Hgb 12.0, Polys 38.9, HCT 36.2, WBC 11.3, Plt 192K, Cr 0.7. \par \par \par \par \par \par  [de-identified] : Patient presents for a follow up appointment.  She continues to have arthralgias in her fingers/wrists/shoulders/knees, feels weakness in her handgrip.  Her symptoms have not improved and are concerning her.  Her fingers continue to be swollen.  She has no recent infections, no fever/chills, no night sweats.  Her appetite/weight are stable.  Concerned about taking the COVID vaccine.

## 2021-03-18 NOTE — ASSESSMENT
[FreeTextEntry1] : This is a 35 year old female with lymphocytosis, bone marrow biopsy is consistent with chronic lymphocytic leukemia.  CT of her neck/chest with small lymph nodes, likely due to CLL- stage I. 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 \par Her WBC is stable, Hg has normalized (post pregnancy).  Her plt remain slightly low, suspect due to ITP or related to her arthralgias.  \par No indications for treatment for her CLL- indications for treatment including cytopenias, bulky disease- lymphadenopathy >10 cm, spleen >6 cm below the costal margin.  \par She was seen by rheumatology.  ESR/CRP, KRISTIE/RF are normal.  Was told it was likely due to pregnancy, post partum.  She was unhappy with the explanation, desires to be seen by another rheumatologist for confirmation.  \par Follow up in 3 months.  She will call if her symptoms worsen.\par

## 2021-05-17 ENCOUNTER — LABORATORY RESULT (OUTPATIENT)
Age: 36
End: 2021-05-17

## 2021-05-17 ENCOUNTER — APPOINTMENT (OUTPATIENT)
Dept: RHEUMATOLOGY | Facility: CLINIC | Age: 36
End: 2021-05-17
Payer: MEDICAID

## 2021-05-17 VITALS
SYSTOLIC BLOOD PRESSURE: 117 MMHG | OXYGEN SATURATION: 99 % | HEIGHT: 63.62 IN | RESPIRATION RATE: 16 BRPM | HEART RATE: 72 BPM | TEMPERATURE: 97.7 F | WEIGHT: 150 LBS | DIASTOLIC BLOOD PRESSURE: 70 MMHG | BODY MASS INDEX: 25.93 KG/M2

## 2021-05-17 DIAGNOSIS — M89.8X9 OTHER SPECIFIED DISORDERS OF BONE, UNSPECIFIED SITE: ICD-10-CM

## 2021-05-17 PROCEDURE — 99215 OFFICE O/P EST HI 40 MIN: CPT

## 2021-05-17 PROCEDURE — 99072 ADDL SUPL MATRL&STAF TM PHE: CPT

## 2021-05-21 DIAGNOSIS — M25.549 PAIN IN JOINTS OF UNSPECIFIED HAND: ICD-10-CM

## 2021-05-21 LAB
25(OH)D3 SERPL-MCNC: 39.8 NG/ML
ALBUMIN SERPL ELPH-MCNC: 4.8 G/DL
ALP BLD-CCNC: 41 U/L
ALT SERPL-CCNC: 12 U/L
ANA SER IF-ACNC: NEGATIVE
ANION GAP SERPL CALC-SCNC: 13 MMOL/L
AST SERPL-CCNC: 16 U/L
BASOPHILS # BLD AUTO: 0.18 K/UL
BASOPHILS NFR BLD AUTO: 0.9 %
BILIRUB SERPL-MCNC: 0.4 MG/DL
BUN SERPL-MCNC: 15 MG/DL
CALCIUM SERPL-MCNC: 9.9 MG/DL
CCP AB SER IA-ACNC: <8 UNITS
CHLORIDE SERPL-SCNC: 106 MMOL/L
CK SERPL-CCNC: 92 U/L
CO2 SERPL-SCNC: 25 MMOL/L
CREAT SERPL-MCNC: 0.72 MG/DL
CRP SERPL-MCNC: <3 MG/L
DEPRECATED KAPPA LC FREE/LAMBDA SER: 3.43 RATIO
EOSINOPHIL # BLD AUTO: 0.18 K/UL
EOSINOPHIL NFR BLD AUTO: 0.9 %
ERYTHROCYTE [SEDIMENTATION RATE] IN BLOOD BY WESTERGREN METHOD: 2 MM/HR
GLUCOSE SERPL-MCNC: 101 MG/DL
HCT VFR BLD CALC: 39 %
HGB BLD-MCNC: 12.2 G/DL
IGA SER QL IEP: 46 MG/DL
IGG SER QL IEP: 823 MG/DL
IGM SER QL IEP: 33 MG/DL
KAPPA LC CSF-MCNC: 0.68 MG/DL
KAPPA LC SERPL-MCNC: 2.33 MG/DL
LYMPHOCYTES # BLD AUTO: 11.63 K/UL
LYMPHOCYTES NFR BLD AUTO: 58.3 %
MAN DIFF?: NORMAL
MCHC RBC-ENTMCNC: 27 PG
MCHC RBC-ENTMCNC: 31.3 GM/DL
MCV RBC AUTO: 86.3 FL
MONOCYTES # BLD AUTO: 0.52 K/UL
MONOCYTES NFR BLD AUTO: 2.6 %
NEUTROPHILS # BLD AUTO: 6.06 K/UL
NEUTROPHILS NFR BLD AUTO: 30.4 %
PLATELET # BLD AUTO: 139 K/UL
POTASSIUM SERPL-SCNC: 4.8 MMOL/L
PROT SERPL-MCNC: 6.9 G/DL
RBC # BLD: 4.52 M/UL
RBC # FLD: 12.7 %
RF+CCP IGG SER-IMP: NEGATIVE
RHEUMATOID FACT SER QL: <10 IU/ML
SODIUM SERPL-SCNC: 143 MMOL/L
THYROGLOB AB SERPL-ACNC: <20 IU/ML
THYROPEROXIDASE AB SERPL IA-ACNC: <10 IU/ML
WBC # FLD AUTO: 19.95 K/UL

## 2021-05-24 DIAGNOSIS — M77.8 OTHER ENTHESOPATHIES, NOT ELSEWHERE CLASSIFIED: ICD-10-CM

## 2021-06-03 ENCOUNTER — LABORATORY RESULT (OUTPATIENT)
Age: 36
End: 2021-06-03

## 2021-06-03 ENCOUNTER — APPOINTMENT (OUTPATIENT)
Dept: PEDIATRIC ALLERGY IMMUNOLOGY | Facility: CLINIC | Age: 36
End: 2021-06-03
Payer: MEDICAID

## 2021-06-03 VITALS
OXYGEN SATURATION: 99 % | SYSTOLIC BLOOD PRESSURE: 118 MMHG | TEMPERATURE: 97.6 F | BODY MASS INDEX: 26.42 KG/M2 | HEART RATE: 74 BPM | HEIGHT: 63.58 IN | DIASTOLIC BLOOD PRESSURE: 73 MMHG | WEIGHT: 151 LBS

## 2021-06-03 DIAGNOSIS — Z80.6 FAMILY HISTORY OF LEUKEMIA: ICD-10-CM

## 2021-06-03 DIAGNOSIS — Z80.7 FAMILY HISTORY OF OTHER MALIGNANT NEOPLASMS OF LYMPHOID, HEMATOPOIETIC AND RELATED TISSUES: ICD-10-CM

## 2021-06-03 PROCEDURE — 99204 OFFICE O/P NEW MOD 45 MIN: CPT | Mod: 25

## 2021-06-03 RX ORDER — FLUTICASONE PROPIONATE 50 UG/1
50 SPRAY, METERED NASAL TWICE DAILY
Qty: 1 | Refills: 1 | Status: COMPLETED | COMMUNITY
Start: 2019-06-04 | End: 2021-06-03

## 2021-06-04 LAB
CD16+CD56+ CELLS # BLD: 139 /UL
CD16+CD56+ CELLS NFR BLD: 1 %
CD19 CELLS NFR BLD: 9663 /UL
CD3 CELLS # BLD: 1490 /UL
CD3 CELLS NFR BLD: 14 %
CD3+CD4+ CELLS # BLD: 739 /UL
CD3+CD4+ CELLS NFR BLD: 8 %
CD3+CD4+ CELLS/CD3+CD8+ CLL SPEC: 1.12 RATIO
CD3+CD8+ CELLS # SPEC: 658 /UL
CD3+CD8+ CELLS NFR BLD: 7 %
CELLS.CD3-CD19+/CELLS IN BLOOD: 85 %
DEPRECATED KAPPA LC FREE/LAMBDA SER: 5.29 RATIO
IGA SER QL IEP: 50 MG/DL
IGG SER QL IEP: 851 MG/DL
IGM SER QL IEP: 34 MG/DL
KAPPA LC CSF-MCNC: 0.52 MG/DL
KAPPA LC SERPL-MCNC: 2.75 MG/DL
MEV IGG FLD QL IA: 6 AU/ML
MEV IGG+IGM SER-IMP: NEGATIVE
MUV AB SER-ACNC: POSITIVE
MUV IGG SER QL IA: 125 AU/ML
RUBV IGG FLD-ACNC: 5.2 INDEX
RUBV IGG SER-IMP: POSITIVE
VZV AB TITR SER: NEGATIVE
VZV IGG SER IF-ACNC: <10 INDEX

## 2021-06-04 NOTE — CONSULT LETTER
[Dear  ___] : Dear  [unfilled], [Consult Letter:] : I had the pleasure of evaluating your patient, [unfilled]. [Please see my note below.] : Please see my note below. [This report is provisional, pending the completion of the evaluation.  A final diagnosis and plan will follow.] : This report is provisional, pending the completion of the evaluation.  A final diagnosis and plan will follow. [Consult Closing:] : Thank you very much for allowing me to participate in the care of this patient.  If you have any questions, please do not hesitate to contact me. [Sincerely,] : Sincerely, [DrIris  ___] : Dr. COLIN [FreeTextEntry2] : SIA NELSON [FreeTextEntry3] : Arnoldo Currie MD\par ___________________________________\par Fellow, Division of Allergy and Immunology\par [facility]\par [facility]\par \par Eugene Alaniz III  MPH, MD, PhD, FACP, FACAAI, FAAAAI \par , Departments of Medicine and Pediatrics \par [facility] \par , [facility] Research \par Attending Physician, Division of Allergy & Immunology [facility]\par \par \par \par \par

## 2021-06-04 NOTE — REASON FOR VISIT
[Initial Consultation] : an initial consultation for [Immune Evaluation] : immune evaluation [FreeTextEntry2] : immunodeficiency evaluation

## 2021-06-04 NOTE — HISTORY OF PRESENT ILLNESS
[de-identified] : ERIC RAZO is a 35 year old White female with CLL (Dx 2016; stage 0; on surveillance only, no chemo/XRT use history) who presents for evaluation of immunodeficiency. She was referred by Dr. Franco (Heme/Onc) for immune evaluation. She had recent immunoglobulins screening 5/17/21 which showed low IgA (45), low IgM (33); IgG was wnl (823). \par \par History of infections:\par She denies any history of recurrent infections or severe infections. Over the past year, she reports having approximately 0 infections. Over the past year, she has been prescribed 0 courses of antibiotics. She denies history of pneumonia or bacteremia. She denies any family history of immunodeficiency, autoimmunity, consanguinity, miscarriages or early infant deaths. She does report a strong history of malignancy in her family; mom (breast cancer), grandmother (multiple myeloma), maternal aunt (leukemia). She reports being up to date with all recommended age-appropriate immunizations.

## 2021-06-04 NOTE — REVIEW OF SYSTEMS
[Nl] : Genitourinary [Immunizations are up to date] : Immunizations are up to date [Fatigue] : fatigue [Diaphoresis] : diaphoretic [Fever] : no fever [Wgt Loss (___ Lbs)] : no recent weight loss [Decreased Appetite] : no decrease in appetite [Cyanosis] : no cyanosis [Edema] : no edema [Chest Pain] : no chest pain or discomfort [Exercise Intolerance] : no persistence of exercise intolerance [Received Influenza Vaccine this Past Year] : patient has not received the Influenza vaccine this past year

## 2021-06-04 NOTE — SOCIAL HISTORY
[Spouse/Partner] : spouse/partner [House] : [unfilled] lives in a house  [Radiator/Baseboard] : heating provided by radiator(s)/baseboard(s) [Window Units] : air conditioning provided by window units [Dry] : dry [None] : none [] :  [de-identified] : 3 children [FreeTextEntry2] : real estate [Bedroom] : not in the bedroom [Basement] : not in the basement [Living Area] : not in the living area [Smokers in Household] : there are no smokers in the home

## 2021-06-07 LAB
BASOPHILS # BLD AUTO: 0 K/UL
BASOPHILS NFR BLD AUTO: 0 %
C DIPHTHERIAE AB SER QL: 0.45 IU/ML
C TETANI IGG SER-ACNC: 0.69 IU/ML
CH50 SERPL-MCNC: 75 U/ML
EOSINOPHIL # BLD AUTO: 0 K/UL
EOSINOPHIL NFR BLD AUTO: 0 %
HAEM INFLU B AB SER-MCNC: 3.05 UG/ML
HCT VFR BLD CALC: 37.1 %
HGB BLD-MCNC: 12.1 G/DL
LYMPHOCYTES # BLD AUTO: 10.85 K/UL
LYMPHOCYTES NFR BLD AUTO: 56 %
MAN DIFF?: NORMAL
MANNAN BINDING LECTIN (MBL): 3736 NG/ML
MCHC RBC-ENTMCNC: 27.4 PG
MCHC RBC-ENTMCNC: 32.6 GM/DL
MCV RBC AUTO: 84.1 FL
MONOCYTES # BLD AUTO: 3.49 K/UL
MONOCYTES NFR BLD AUTO: 18 %
NEUTROPHILS # BLD AUTO: 4.07 K/UL
NEUTROPHILS NFR BLD AUTO: 21 %
PLATELET # BLD AUTO: 148 K/UL
RBC # BLD: 4.41 M/UL
RBC # FLD: 12.9 %
WBC # FLD AUTO: 19.38 K/UL

## 2021-06-08 ENCOUNTER — NON-APPOINTMENT (OUTPATIENT)
Age: 36
End: 2021-06-08

## 2021-06-10 LAB — COMPLEMENT, ALTERNATE PATHWAY (AH50): >110

## 2021-06-21 ENCOUNTER — OUTPATIENT (OUTPATIENT)
Dept: OUTPATIENT SERVICES | Facility: HOSPITAL | Age: 36
LOS: 1 days | Discharge: HOME | End: 2021-06-21

## 2021-06-21 DIAGNOSIS — C91.10 CHRONIC LYMPHOCYTIC LEUKEMIA OF B-CELL TYPE NOT HAVING ACHIEVED REMISSION: ICD-10-CM

## 2021-06-21 DIAGNOSIS — Z98.891 HISTORY OF UTERINE SCAR FROM PREVIOUS SURGERY: Chronic | ICD-10-CM

## 2021-06-22 ENCOUNTER — NON-APPOINTMENT (OUTPATIENT)
Age: 36
End: 2021-06-22

## 2021-06-24 ENCOUNTER — RESULT REVIEW (OUTPATIENT)
Age: 36
End: 2021-06-24

## 2021-06-24 ENCOUNTER — APPOINTMENT (OUTPATIENT)
Dept: HEMATOLOGY ONCOLOGY | Facility: CLINIC | Age: 36
End: 2021-06-24
Payer: MEDICAID

## 2021-06-24 VITALS
OXYGEN SATURATION: 99 % | HEART RATE: 73 BPM | DIASTOLIC BLOOD PRESSURE: 76 MMHG | RESPIRATION RATE: 16 BRPM | BODY MASS INDEX: 25.62 KG/M2 | SYSTOLIC BLOOD PRESSURE: 119 MMHG | WEIGHT: 146.39 LBS | HEIGHT: 63.54 IN | TEMPERATURE: 97.7 F

## 2021-06-24 LAB
BASOPHILS # BLD AUTO: 0 K/UL — SIGNIFICANT CHANGE UP (ref 0–0.2)
BASOPHILS NFR BLD AUTO: 0 % — SIGNIFICANT CHANGE UP (ref 0–2)
EOSINOPHIL # BLD AUTO: 0 K/UL — SIGNIFICANT CHANGE UP (ref 0–0.5)
EOSINOPHIL NFR BLD AUTO: 0 % — SIGNIFICANT CHANGE UP (ref 0–6)
HCT VFR BLD CALC: 35.6 % — SIGNIFICANT CHANGE UP (ref 34.5–45)
HGB BLD-MCNC: 11.3 G/DL — LOW (ref 11.5–15.5)
LYMPHOCYTES # BLD AUTO: 13.43 K/UL — HIGH (ref 1–3.3)
LYMPHOCYTES # BLD AUTO: 77 % — HIGH (ref 13–44)
MCHC RBC-ENTMCNC: 26.8 PG — LOW (ref 27–34)
MCHC RBC-ENTMCNC: 31.7 G/DL — LOW (ref 32–36)
MCV RBC AUTO: 84.4 FL — SIGNIFICANT CHANGE UP (ref 80–100)
MONOCYTES # BLD AUTO: 0.35 K/UL — SIGNIFICANT CHANGE UP (ref 0–0.9)
MONOCYTES NFR BLD AUTO: 2 % — SIGNIFICANT CHANGE UP (ref 2–14)
NEUTROPHILS # BLD AUTO: 2.44 K/UL — SIGNIFICANT CHANGE UP (ref 1.8–7.4)
NEUTROPHILS NFR BLD AUTO: 14 % — LOW (ref 43–77)
NRBC # BLD: 0 /100 — SIGNIFICANT CHANGE UP (ref 0–0)
NRBC # BLD: SIGNIFICANT CHANGE UP /100 WBCS (ref 0–0)
PLAT MORPH BLD: NORMAL — SIGNIFICANT CHANGE UP
PLATELET # BLD AUTO: 109 K/UL — LOW (ref 150–400)
RBC # BLD: 4.22 M/UL — SIGNIFICANT CHANGE UP (ref 3.8–5.2)
RBC # FLD: 12.7 % — SIGNIFICANT CHANGE UP (ref 10.3–14.5)
RBC BLD AUTO: SIGNIFICANT CHANGE UP
VARIANT LYMPHS # BLD: 7 % — HIGH (ref 0–6)
WBC # BLD: 17.44 K/UL — HIGH (ref 3.8–10.5)
WBC # FLD AUTO: 17.44 K/UL — HIGH (ref 3.8–10.5)

## 2021-06-24 PROCEDURE — 99213 OFFICE O/P EST LOW 20 MIN: CPT

## 2021-06-25 NOTE — HISTORY OF PRESENT ILLNESS
[de-identified] : CLL- stage 0.  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 \par \par She was found to have elevated white blood cell count on routine blood work.  CBC on 7/1- WBC 11.2 ALC 7750 Hg 12.0 Plt 192.  She was seen by Dr. Subha Charles.   Peripheral blood flow cytometry was sent 8/24/17- revealed a population of B cells, positive for CD19, CD20,negative for CD5 and CD10 with no light chain restriction seen.  Labs sent reveal a normal SPEP/LAINE, k/l ration 1.71, B2 microglobulin 1.41, IgG 889, IgM 87,ESR<2.  She underwent a bone marrow biopsy on 9/26 which revealed a normocellular marrow, trilineage hematopoiesis, minimal involvement with CLL (10-15%)- IHC positive CD19, CD20, CD5, CD23.  CLL FISH revealed deletion 13, no evidence of del 11 or 17.  IGVH is mutated, CD38 is not expressed.  For some reason FISH for bcr-abl was sent- negative along with CALR which was also normal.  She had a PET/CT completed on 9/7 that revealed a 5 mm nodule in her LLL, otherwise no lymphadenopathy or splenomegaly.  She denies any previous medical history. \par \par Labs from 7/31/17 show Monos 0.8, EOS 0.4%, Lymph 69.4%, Hgb 12.0, Polys 29.0, HCT 38.1, WBC 11.2, Plt 192K. Labs from 6/19/16 show Monos 3.5, EOS 0.5%, Lymph 56.7%, Hgb 12.0, Polys 38.9, HCT 36.2, WBC 11.3, Plt 192K, Cr 0.7. \par \par \par \par \par \par  [de-identified] : Patient presents for a follow up appointment.  She has intentionally lost weight, has been intermittently fasting.  Since then, her arthralgias have significantly improved.  She still has hand pains/swollen fingers.  She has no recent infections.  She was seen by immunology, undergoing a work up.  She has no fever/chills, no night sweats.  Her appetite/weight are stable.

## 2021-06-25 NOTE — PHYSICAL EXAM
[Fully active, able to carry on all pre-disease performance without restriction] : Status 0 - Fully active, able to carry on all pre-disease performance without restriction [Normal] : affect appropriate [de-identified] : no palpable cervical/axillary/inguinal lymph nodes

## 2021-06-25 NOTE — ASSESSMENT
[FreeTextEntry1] : This is a 35 year old female with lymphocytosis, bone marrow biopsy is consistent with chronic lymphocytic leukemia.  CT of her neck/chest with small lymph nodes, likely due to CLL- stage I. 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 \par Her WBC is stable, Hg has normalized (post pregnancy).  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  \par No indications for treatment for her CLL- indications for treatment including cytopenias, bulky disease- lymphadenopathy >10 cm, spleen >6 cm below the costal margin.  \par Low IgM/IgA, no infections.  Would not pursue immunoglobulin replacement.  She will continue her work up with immunology.  \par Follow up in 3 months.  \par Discussed that I am leaving the practice, she will follow up with one of my colleagues.  \par

## 2021-06-25 NOTE — REVIEW OF SYSTEMS
[Joint Pain] : joint pain [Joint Stiffness] : joint stiffness [Negative] : Allergic/Immunologic [Fever] : no fever [Chills] : no chills [Night Sweats] : no night sweats [Fatigue] : no fatigue [Recent Change In Weight] : ~T recent weight change [FreeTextEntry2] : intentional loss [FreeTextEntry9] : better

## 2021-07-20 LAB
ALBUMIN SERPL ELPH-MCNC: 4.8 G/DL
ALP BLD-CCNC: 51 U/L
ALT SERPL-CCNC: 30 U/L
ANION GAP SERPL CALC-SCNC: 11 MMOL/L
AST SERPL-CCNC: 21 U/L
BILIRUB SERPL-MCNC: 0.4 MG/DL
BUN SERPL-MCNC: 12 MG/DL
CALCIUM SERPL-MCNC: 9.8 MG/DL
CHLORIDE SERPL-SCNC: 103 MMOL/L
CO2 SERPL-SCNC: 28 MMOL/L
CREAT SERPL-MCNC: 0.71 MG/DL
GLUCOSE SERPL-MCNC: 106 MG/DL
LDH SERPL-CCNC: 132 U/L
LDH SERPL-CCNC: 142 U/L
LDH SERPL-CCNC: 144 U/L
POTASSIUM SERPL-SCNC: 4.4 MMOL/L
PROT SERPL-MCNC: 6.9 G/DL
SODIUM SERPL-SCNC: 142 MMOL/L

## 2021-08-02 ENCOUNTER — RESULT REVIEW (OUTPATIENT)
Age: 36
End: 2021-08-02

## 2021-08-02 ENCOUNTER — OUTPATIENT (OUTPATIENT)
Dept: OUTPATIENT SERVICES | Facility: HOSPITAL | Age: 36
LOS: 1 days | End: 2021-08-02
Payer: MEDICAID

## 2021-08-02 ENCOUNTER — APPOINTMENT (OUTPATIENT)
Dept: ULTRASOUND IMAGING | Facility: IMAGING CENTER | Age: 36
End: 2021-08-02
Payer: MEDICAID

## 2021-08-02 DIAGNOSIS — Z98.891 HISTORY OF UTERINE SCAR FROM PREVIOUS SURGERY: Chronic | ICD-10-CM

## 2021-08-02 DIAGNOSIS — Z00.8 ENCOUNTER FOR OTHER GENERAL EXAMINATION: ICD-10-CM

## 2021-08-02 PROCEDURE — 76705 ECHO EXAM OF ABDOMEN: CPT | Mod: 26,RT

## 2021-08-02 PROCEDURE — 76705 ECHO EXAM OF ABDOMEN: CPT

## 2021-08-31 ENCOUNTER — APPOINTMENT (OUTPATIENT)
Dept: HEMATOLOGY ONCOLOGY | Facility: CLINIC | Age: 36
End: 2021-08-31
Payer: MEDICAID

## 2021-08-31 VITALS
HEART RATE: 87 BPM | RESPIRATION RATE: 14 BRPM | TEMPERATURE: 97.5 F | SYSTOLIC BLOOD PRESSURE: 102 MMHG | BODY MASS INDEX: 25.42 KG/M2 | DIASTOLIC BLOOD PRESSURE: 72 MMHG | WEIGHT: 146 LBS | OXYGEN SATURATION: 97 %

## 2021-08-31 PROCEDURE — 99215 OFFICE O/P EST HI 40 MIN: CPT

## 2021-08-31 PROCEDURE — 85025 COMPLETE CBC W/AUTO DIFF WBC: CPT

## 2021-08-31 NOTE — PHYSICAL EXAM
[Fully active, able to carry on all pre-disease performance without restriction] : Status 0 - Fully active, able to carry on all pre-disease performance without restriction [Normal] : affect appropriate [de-identified] : one small palpable in left cervical area. no alaxillary/inguinal lymph nodes

## 2021-08-31 NOTE — HISTORY OF PRESENT ILLNESS
[de-identified] : Mrs. West is a 36 year old female with history of CLL- stage 0.  deletion 13 and mutated IGVH, non expression CD38, negative Zap70 which are all favorable (diagnosed in 2018).\par \par She was found to have elevated white blood cell count on routine blood work.  CBC on 7/1- WBC 11.2 ALC 7750 Hg 12.0 Plt 192.  She was seen by Dr. Subha Charles.   Peripheral blood flow cytometry was sent 8/24/17- revealed a population of B cells, positive for CD19, CD20,negative for CD5 and CD10 with no light chain restriction seen.  Labs sent reveal a normal SPEP/LAINE, k/l ration 1.71, B2 microglobulin 1.41, IgG 889, IgM 87,ESR<2.  She underwent a bone marrow biopsy on 9/26 which revealed a normocellular marrow, trilineage hematopoiesis, minimal involvement with CLL (10-15%)- IHC positive CD19, CD20, CD5, CD23.  CLL FISH revealed deletion 13, no evidence of del 11 or 17.  IGVH is mutated, CD38 is not expressed.  For some reason FISH for bcr-abl was sent- negative along with CALR which was also normal.  She had a PET/CT completed on 9/7 that revealed a 5 mm nodule in her LLL, otherwise no lymphadenopathy or splenomegaly.  She denies any previous medical history. \par \par Labs from 7/31/17 show Monos 0.8, EOS 0.4%, Lymph 69.4%, Hgb 12.0, Polys 29.0, HCT 38.1, WBC 11.2, Plt 192K. Labs from 6/19/16 show Monos 3.5, EOS 0.5%, Lymph 56.7%, Hgb 12.0, Polys 38.9, HCT 36.2, WBC 11.3, Plt 192K, Cr 0.7. \par \par \par \par \par \par  [de-identified] : Patient presents for a follow up and is transitioning care from Dr. Franco to us. She feels well overall and has no new complaints. She remains unvaccinated against COVID 19, but she had covid infection last year (same as her  and children). No fevers/chills. No drenching night sweats. She has intentionally lost weight, has been intermittently fasting. Appetite is good. She has no recent infections.  She was seen by immunology, undergoing a work up.  No new medications.

## 2021-08-31 NOTE — RESULTS/DATA
[FreeTextEntry1] : CBC 8/31/21:\par WBC 1539\par ALC 12.49\par ANC 2.88\par Hgb 12.3\par Hct 37.1\par Plt 129.0

## 2021-08-31 NOTE — ASSESSMENT
[FreeTextEntry1] : This is a 36 year old female with lymphocytosis, bone marrow biopsy is consistent with chronic lymphocytic leukemia.  CT of her neck/chest with small lymph nodes, likely due to CLL- stage I. 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 \par CBC reviewed, counts remained stable, hgb 12.3, Plt 129\par No indications for treatment for her CLL- indications for treatment including cytopenias, symptomatic bulky disease- lymphadenopathy, splenomegaly \par Low IgM/IgA, no infections.  Would not pursue immunoglobulin replacement.  She will continue her work up with immunology.  \par RTO in 4 months, sooner PRN\par

## 2021-09-01 ENCOUNTER — NON-APPOINTMENT (OUTPATIENT)
Age: 36
End: 2021-09-01

## 2021-09-01 ENCOUNTER — APPOINTMENT (OUTPATIENT)
Dept: HEMATOLOGY ONCOLOGY | Facility: CLINIC | Age: 36
End: 2021-09-01
Payer: MEDICAID

## 2021-09-01 LAB
ALBUMIN SERPL ELPH-MCNC: 5 G/DL
ALP BLD-CCNC: 39 U/L
ALT SERPL-CCNC: 17 U/L
ANION GAP SERPL CALC-SCNC: 10 MMOL/L
AST SERPL-CCNC: 14 U/L
BILIRUB SERPL-MCNC: 0.2 MG/DL
BUN SERPL-MCNC: 14 MG/DL
CALCIUM SERPL-MCNC: 9.5 MG/DL
CHLORIDE SERPL-SCNC: 109 MMOL/L
CO2 SERPL-SCNC: 22 MMOL/L
COVID-19 NUCLEOCAPSID  GAM ANTIBODY INTERPRETATION: POSITIVE
CREAT SERPL-MCNC: 0.7 MG/DL
GLUCOSE SERPL-MCNC: 107 MG/DL
LDH SERPL-CCNC: 140 U/L
MAGNESIUM SERPL-MCNC: 2.1 MG/DL
PHOSPHATE SERPL-MCNC: 2.9 MG/DL
POTASSIUM SERPL-SCNC: 4.4 MMOL/L
PROT SERPL-MCNC: 6.9 G/DL
SARS-COV-2 AB SERPL QL IA: 1.02 INDEX
SODIUM SERPL-SCNC: 140 MMOL/L
URATE SERPL-MCNC: 3.8 MG/DL

## 2021-09-01 PROCEDURE — 99441: CPT

## 2021-09-02 DIAGNOSIS — Z86.19 PERSONAL HISTORY OF OTHER INFECTIOUS AND PARASITIC DISEASES: ICD-10-CM

## 2021-09-02 DIAGNOSIS — Z86.2 PERSONAL HISTORY OF DISEASES OF THE BLOOD AND BLOOD-FORMING ORGANS AND CERTAIN DISORDERS INVOLVING THE IMMUNE MECHANISM: ICD-10-CM

## 2021-09-02 DIAGNOSIS — Z87.19 PERSONAL HISTORY OF OTHER DISEASES OF THE DIGESTIVE SYSTEM: ICD-10-CM

## 2021-09-02 DIAGNOSIS — Z12.72 ENCOUNTER FOR SCREENING FOR MALIGNANT NEOPLASM OF VAGINA: ICD-10-CM

## 2021-09-02 DIAGNOSIS — N94.89 OTHER SPECIFIED CONDITIONS ASSOCIATED WITH FEMALE GENITAL ORGANS AND MENSTRUAL CYCLE: ICD-10-CM

## 2021-09-02 LAB
COVID-19 SPIKE DOMAIN ANTIBODY INTERPRETATION: NEGATIVE
SARS-COV-2 AB SERPL IA-ACNC: 0.4 U/ML

## 2021-09-09 ENCOUNTER — LABORATORY RESULT (OUTPATIENT)
Age: 36
End: 2021-09-09

## 2021-09-09 ENCOUNTER — APPOINTMENT (OUTPATIENT)
Dept: PEDIATRIC ALLERGY IMMUNOLOGY | Facility: CLINIC | Age: 36
End: 2021-09-09
Payer: MEDICAID

## 2021-09-09 VITALS
OXYGEN SATURATION: 98 % | HEIGHT: 64 IN | HEART RATE: 76 BPM | BODY MASS INDEX: 23.9 KG/M2 | DIASTOLIC BLOOD PRESSURE: 71 MMHG | SYSTOLIC BLOOD PRESSURE: 113 MMHG | WEIGHT: 140 LBS

## 2021-09-09 DIAGNOSIS — Z13.29 ENCOUNTER FOR SCREENING FOR OTHER SUSPECTED ENDOCRINE DISORDER: ICD-10-CM

## 2021-09-09 DIAGNOSIS — Z13.228 ENCOUNTER FOR SCREENING FOR OTHER SUSPECTED ENDOCRINE DISORDER: ICD-10-CM

## 2021-09-09 DIAGNOSIS — Z13.0 ENCOUNTER FOR SCREENING FOR OTHER SUSPECTED ENDOCRINE DISORDER: ICD-10-CM

## 2021-09-09 DIAGNOSIS — R76.8 OTHER SPECIFIED ABNORMAL IMMUNOLOGICAL FINDINGS IN SERUM: ICD-10-CM

## 2021-09-09 LAB
DEPRECATED S PNEUM 1 IGG SER-MCNC: 16.5 MCG/ML
DEPRECATED S PNEUM12 AB SER-ACNC: 2.9 MCG/ML
DEPRECATED S PNEUM14 AB SER-ACNC: 28.9 MCG/ML
DEPRECATED S PNEUM17 IGG SER IA-MCNC: 46.6 MCG/ML
DEPRECATED S PNEUM18 IGG SER IA-MCNC: 3.9 MCG/ML
DEPRECATED S PNEUM19 IGG SER-MCNC: 19.3 MCG/ML
DEPRECATED S PNEUM19 IGG SER-MCNC: 9.6 MCG/ML
DEPRECATED S PNEUM2 IGG SER-MCNC: 2.1 MCG/ML
DEPRECATED S PNEUM20 IGG SER-MCNC: 8.2 MCG/ML
DEPRECATED S PNEUM22 IGG SER-MCNC: 75.2 MCG/ML
DEPRECATED S PNEUM23 AB SER-ACNC: 89.9 MCG/ML
DEPRECATED S PNEUM3 AB SER-ACNC: 6.2 MCG/ML
DEPRECATED S PNEUM34 IGG SER-MCNC: 25.8 MCG/ML
DEPRECATED S PNEUM4 AB SER-ACNC: 3.7 MCG/ML
DEPRECATED S PNEUM5 IGG SER-MCNC: 20 MCG/ML
DEPRECATED S PNEUM6 IGG SER-MCNC: 17.8 MCG/ML
DEPRECATED S PNEUM7 IGG SER-ACNC: 48.7 MCG/ML
DEPRECATED S PNEUM8 AB SER-ACNC: 11.3 MCG/ML
DEPRECATED S PNEUM9 AB SER-ACNC: NORMAL
DEPRECATED S PNEUM9 IGG SER-MCNC: 9.1 MCG/ML
STREPTOCOCCUS PNEUMONIAE SEROTYPE 11A: 9.6 MCG/ML
STREPTOCOCCUS PNEUMONIAE SEROTYPE 15B: 6.1 MCG/ML
STREPTOCOCCUS PNEUMONIAE SEROTYPE 33F: 8.9 MCG/ML

## 2021-09-09 PROCEDURE — 99214 OFFICE O/P EST MOD 30 MIN: CPT | Mod: 25

## 2021-09-10 ENCOUNTER — APPOINTMENT (OUTPATIENT)
Dept: PEDIATRIC MEDICAL GENETICS | Facility: CLINIC | Age: 36
End: 2021-09-10

## 2021-09-10 LAB
BASOPHILS # BLD AUTO: 0 K/UL
BASOPHILS NFR BLD AUTO: 0 %
EOSINOPHIL # BLD AUTO: 0 K/UL
EOSINOPHIL NFR BLD AUTO: 0 %
HCT VFR BLD CALC: 38.6 %
HGB BLD-MCNC: 12 G/DL
LYMPHOCYTES # BLD AUTO: 8.85 K/UL
LYMPHOCYTES NFR BLD AUTO: 58 %
MAN DIFF?: NORMAL
MCHC RBC-ENTMCNC: 26.8 PG
MCHC RBC-ENTMCNC: 31.1 GM/DL
MCV RBC AUTO: 86.4 FL
MEV IGG FLD QL IA: 9 AU/ML
MEV IGG+IGM SER-IMP: NEGATIVE
MONOCYTES # BLD AUTO: 2.29 K/UL
MONOCYTES NFR BLD AUTO: 15 %
NEUTROPHILS # BLD AUTO: 3.05 K/UL
NEUTROPHILS NFR BLD AUTO: 20 %
PLATELET # BLD AUTO: 134 K/UL
RBC # BLD: 4.47 M/UL
RBC # FLD: 12.8 %
VZV AB TITR SER: NEGATIVE
VZV IGG SER IF-ACNC: <10 INDEX
WBC # FLD AUTO: 15.26 K/UL

## 2021-09-10 NOTE — CONSULT LETTER
[Dear  ___] : Dear  [unfilled], [Courtesy Letter:] : I had the pleasure of seeing your patient, [unfilled], in my office today. [Please see my note below.] : Please see my note below. [Sincerely,] : Sincerely, [DrIris  ___] : Dr. COLIN [FreeTextEntry3] : Alejandrina Connell MD PGY1 Internal Medicine\par \par Eugene Alaniz III  MPH, MD, PhD, FACP, FACAAI, FAAAAI \par , Departments of Medicine and Pediatrics \par [facility] [employer] [facility] \par , Center for Health Innovations and Outcomes Research [facility] Research \par Attending Physician, Division of Allergy & Immunology [facility]\par \par \par

## 2021-09-10 NOTE — HISTORY OF PRESENT ILLNESS
[de-identified] : Thalia West is a 37 yo F with h/o CLL (dx'd 2016, stage 0 surveillance only) who presents for evaluation of hypogammaglobulinemia. Pt was found to have IgG wnl but hypo-IgM and IgA on labs from 2021. Titers for measles and varicella were negative, but positive for mumps and rubella. Pt cannot remember whether she has gotten boosters prior to initial visit 2021. Pt denies history of infections, flu, pneumonia, or colds. Last time she felt sick had been in  when she had a UTI after  and was treated with abx x7d. Otherwise, feels well. Has headaches 2x a weeks since her c-sections, daily and night sweats,  and waxing and waning cervical lymph node swelling. Denies fevers, vision changes, CP, SOB, abdominal pain, dysuria, and BMs have been soft and regular. Pt planning to go for elective cholecystomy in 10/2021 and awaiting immunology clearance beforehand. Also interested in IVIG infusions and wants to work toward getting that approved with insurance\par \par No infections or Abx use since last visit.

## 2021-09-10 NOTE — DATA REVIEWED
[FreeTextEntry1] : cellular immune eval:\par CBC remarkable for elevated WBC, decreased platelets\par elevated CD19 B with otherwise unremarkable CD3, CD4, CD8 T,  and CD16/56 NK cell counts (note decreased % of T and NK cells)\par NOT DONE DUE TO LAB ERROR: lymphocyte proliferation studies\par \par humoral immune eval:\par decreased IgA, IgM with elevated kappa light chain, with otherwise unremarkable IgG,\par negative titers to measles, varicella,\par protective titers to mumps, rubella, tetanus, diphtheria, pneumococcus, Hib\par \par complement eval:\par unremarkable CH50, AH50, MBL\par

## 2021-09-10 NOTE — PHYSICAL EXAM
[Alert] : alert [Well Nourished] : well nourished [Healthy Appearance] : healthy appearance [No Acute Distress] : no acute distress [Well Developed] : well developed [Normal Pupil & Iris Size/Symmetry] : normal pupil and iris size and symmetry [No Discharge] : no discharge [No Photophobia] : no photophobia [Sclera Not Icteric] : sclera not icteric [Normal TMs] : both tympanic membranes were normal [Normal Nasal Mucosa] : the nasal mucosa was normal [Normal Lips/Tongue] : the lips and tongue were normal [Normal Outer Ear/Nose] : the ears and nose were normal in appearance [Normal Tonsils] : normal tonsils [No Thrush] : no thrush [No LAD] : no lymphadenopathy [Supple] : the neck was supple [Normal Rate and Effort] : normal respiratory rhythm and effort [No Crackles] : no crackles [No Retractions] : no retractions [Bilateral Audible Breath Sounds] : bilateral audible breath sounds [Normal Rate] : heart rate was normal  [Normal S1, S2] : normal S1 and S2 [No murmur] : no murmur [Regular Rhythm] : with a regular rhythm [Soft] : abdomen soft [Not Tender] : non-tender [Not Distended] : not distended [No HSM] : no hepato-splenomegaly [Normal Cervical Lymph Nodes] : cervical [Skin Intact] : skin intact  [No Rash] : no rash [No Skin Lesions] : no skin lesions [No clubbing] : no clubbing [No Edema] : no edema [No Cyanosis] : no cyanosis [Normal Mood] : mood was normal [Normal Affect] : affect was normal [Alert, Awake, Oriented as Age-Appropriate] : alert, awake, oriented as age appropriate [No Motor Deficits] : the motor exam was normal [Conjunctival Erythema] : no conjunctival erythema [Suborbital Bogginess] : no suborbital bogginess (allergic shiners) [Pale mucosa] : no pale mucosa [Boggy Nasal Turbinates] : no boggy and/or pale nasal turbinates [Pharyngeal erythema] : no pharyngeal erythema [Posterior Pharyngeal Cobblestoning] : no posterior pharyngeal cobblestoning [Clear Rhinorrhea] : no clear rhinorrhea was seen [Exudate] : no exudate

## 2021-09-10 NOTE — CONSULT LETTER
[Dear  ___] : Dear  [unfilled], [Courtesy Letter:] : I had the pleasure of seeing your patient, [unfilled], in my office today. [Please see my note below.] : Please see my note below. [Sincerely,] : Sincerely, [DrIris  ___] : Dr. COLIN [FreeTextEntry3] : Alejandrina Connell MD PGY1 Internal Medicine\par \par Eugene Alaniz III  MPH, MD, PhD, FACP, FACAAI, FAAAAI \par , Departments of Medicine and Pediatrics \par [facility] \par , [facility] for Health [facility]ations and Outcomes Research [facility] Research \par Attending Physician, Division of Allergy & Immunology [facility]\par \par \par

## 2021-09-10 NOTE — HISTORY OF PRESENT ILLNESS
[de-identified] : Thalia West is a 37 yo F with h/o CLL (dx'd 2016, stage 0 surveillance only) who presents for evaluation of hypogammaglobulinemia. Pt was found to have IgG wnl but hypo-IgM and IgA on labs from 2021. Titers for measles and varicella were negative, but positive for mumps and rubella. Pt cannot remember whether she has gotten boosters prior to initial visit 2021. Pt denies history of infections, flu, pneumonia, or colds. Last time she felt sick had been in  when she had a UTI after  and was treated with abx x7d. Otherwise, feels well. Has headaches 2x a weeks since her c-sections, daily and night sweats,  and waxing and waning cervical lymph node swelling. Denies fevers, vision changes, CP, SOB, abdominal pain, dysuria, and BMs have been soft and regular. Pt planning to go for elective cholecystomy in 10/2021 and awaiting immunology clearance beforehand. Also interested in IVIG infusions and wants to work toward getting that approved with insurance\par \par No infections or Abx use since last visit.

## 2021-09-13 ENCOUNTER — NON-APPOINTMENT (OUTPATIENT)
Age: 36
End: 2021-09-13

## 2021-09-13 LAB
CD16+CD56+ CELLS # BLD: 102 /UL
CD16+CD56+ CELLS NFR BLD: 1 %
CD19 CELLS NFR BLD: 9692 /UL
CD3 CELLS # BLD: 1529 /UL
CD3 CELLS NFR BLD: 14 %
CD3+CD4+ CELLS # BLD: 787 /UL
CD3+CD4+ CELLS NFR BLD: 8 %
CD3+CD4+ CELLS/CD3+CD8+ CLL SPEC: 1.2 RATIO
CD3+CD8+ CELLS # SPEC: 656 /UL
CD3+CD8+ CELLS NFR BLD: 6 %
CELLS.CD3-CD19+/CELLS IN BLOOD: 85 %
DEPRECATED KAPPA LC FREE/LAMBDA SER: 4.57 RATIO
IGA SER QL IEP: 43 MG/DL
IGG SER QL IEP: 753 MG/DL
IGG SUBSET TOTAL IGG: 735 MG/DL
IGG1 SER-MCNC: 338 MG/DL
IGG2 SER-MCNC: 300 MG/DL
IGG3 SER-MCNC: 64 MG/DL
IGG4 SER-MCNC: 9 MG/DL
IGM SER QL IEP: 26 MG/DL
KAPPA LC CSF-MCNC: 0.51 MG/DL
KAPPA LC SERPL-MCNC: 2.33 MG/DL

## 2021-09-15 PROBLEM — R76.8 ELEVATED SERUM IMMUNOGLOBULIN FREE LIGHT CHAINS: Status: ACTIVE | Noted: 2021-09-15

## 2021-09-20 ENCOUNTER — APPOINTMENT (OUTPATIENT)
Dept: OBGYN | Facility: CLINIC | Age: 36
End: 2021-09-20

## 2021-10-05 ENCOUNTER — OUTPATIENT (OUTPATIENT)
Dept: OUTPATIENT SERVICES | Facility: HOSPITAL | Age: 36
LOS: 1 days | End: 2021-10-05
Payer: MEDICAID

## 2021-10-05 VITALS
OXYGEN SATURATION: 99 % | TEMPERATURE: 97 F | SYSTOLIC BLOOD PRESSURE: 123 MMHG | DIASTOLIC BLOOD PRESSURE: 72 MMHG | HEIGHT: 65 IN | HEART RATE: 74 BPM | WEIGHT: 141.1 LBS | RESPIRATION RATE: 16 BRPM

## 2021-10-05 DIAGNOSIS — K80.20 CALCULUS OF GALLBLADDER WITHOUT CHOLECYSTITIS WITHOUT OBSTRUCTION: ICD-10-CM

## 2021-10-05 DIAGNOSIS — Z90.89 ACQUIRED ABSENCE OF OTHER ORGANS: Chronic | ICD-10-CM

## 2021-10-05 DIAGNOSIS — Z98.891 HISTORY OF UTERINE SCAR FROM PREVIOUS SURGERY: Chronic | ICD-10-CM

## 2021-10-05 DIAGNOSIS — Z01.818 ENCOUNTER FOR OTHER PREPROCEDURAL EXAMINATION: ICD-10-CM

## 2021-10-05 LAB
ALBUMIN SERPL ELPH-MCNC: 4.9 G/DL — SIGNIFICANT CHANGE UP (ref 3.3–5)
ALP SERPL-CCNC: 42 U/L — SIGNIFICANT CHANGE UP (ref 40–120)
ALT FLD-CCNC: 19 U/L — SIGNIFICANT CHANGE UP (ref 10–45)
ANION GAP SERPL CALC-SCNC: 15 MMOL/L — SIGNIFICANT CHANGE UP (ref 5–17)
AST SERPL-CCNC: 17 U/L — SIGNIFICANT CHANGE UP (ref 10–40)
BILIRUB SERPL-MCNC: 0.4 MG/DL — SIGNIFICANT CHANGE UP (ref 0.2–1.2)
BUN SERPL-MCNC: 12 MG/DL — SIGNIFICANT CHANGE UP (ref 7–23)
CALCIUM SERPL-MCNC: 9.8 MG/DL — SIGNIFICANT CHANGE UP (ref 8.4–10.5)
CHLORIDE SERPL-SCNC: 106 MMOL/L — SIGNIFICANT CHANGE UP (ref 96–108)
CO2 SERPL-SCNC: 22 MMOL/L — SIGNIFICANT CHANGE UP (ref 22–31)
CREAT SERPL-MCNC: 0.68 MG/DL — SIGNIFICANT CHANGE UP (ref 0.5–1.3)
GLUCOSE SERPL-MCNC: 101 MG/DL — HIGH (ref 70–99)
HCT VFR BLD CALC: 37.9 % — SIGNIFICANT CHANGE UP (ref 34.5–45)
HGB BLD-MCNC: 11.8 G/DL — SIGNIFICANT CHANGE UP (ref 11.5–15.5)
MCHC RBC-ENTMCNC: 26.3 PG — LOW (ref 27–34)
MCHC RBC-ENTMCNC: 31.1 GM/DL — LOW (ref 32–36)
MCV RBC AUTO: 84.4 FL — SIGNIFICANT CHANGE UP (ref 80–100)
NRBC # BLD: 0 /100 WBCS — SIGNIFICANT CHANGE UP (ref 0–0)
PLATELET # BLD AUTO: 133 K/UL — LOW (ref 150–400)
POTASSIUM SERPL-MCNC: 4.5 MMOL/L — SIGNIFICANT CHANGE UP (ref 3.5–5.3)
POTASSIUM SERPL-SCNC: 4.5 MMOL/L — SIGNIFICANT CHANGE UP (ref 3.5–5.3)
PROT SERPL-MCNC: 7.3 G/DL — SIGNIFICANT CHANGE UP (ref 6–8.3)
RBC # BLD: 4.49 M/UL — SIGNIFICANT CHANGE UP (ref 3.8–5.2)
RBC # FLD: 12.7 % — SIGNIFICANT CHANGE UP (ref 10.3–14.5)
SODIUM SERPL-SCNC: 143 MMOL/L — SIGNIFICANT CHANGE UP (ref 135–145)
WBC # BLD: 24.58 K/UL — HIGH (ref 3.8–10.5)
WBC # FLD AUTO: 24.58 K/UL — HIGH (ref 3.8–10.5)

## 2021-10-05 PROCEDURE — 80053 COMPREHEN METABOLIC PANEL: CPT

## 2021-10-05 PROCEDURE — 36415 COLL VENOUS BLD VENIPUNCTURE: CPT

## 2021-10-05 PROCEDURE — 85027 COMPLETE CBC AUTOMATED: CPT

## 2021-10-05 PROCEDURE — G0463: CPT

## 2021-10-05 RX ORDER — LIDOCAINE HCL 20 MG/ML
0.2 VIAL (ML) INJECTION ONCE
Refills: 0 | Status: DISCONTINUED | OUTPATIENT
Start: 2021-10-19 | End: 2021-11-02

## 2021-10-05 RX ORDER — SODIUM CHLORIDE 9 MG/ML
3 INJECTION INTRAMUSCULAR; INTRAVENOUS; SUBCUTANEOUS EVERY 8 HOURS
Refills: 0 | Status: DISCONTINUED | OUTPATIENT
Start: 2021-10-19 | End: 2021-11-02

## 2021-10-05 NOTE — H&P PST ADULT - PROBLEM SELECTOR PLAN 1
Pt is scheduled for a laparoscopic cholecystectomy on 10/19/21 with Dr. Hopkins  Urine HCG on admit  Covid PCR test scheduled for 10/16/21 at [facility]

## 2021-10-05 NOTE — H&P PST ADULT - NSICDXPASTSURGICALHX_GEN_ALL_CORE_FT
PAST SURGICAL HISTORY:  History of 2  sections 2008, 2011     PAST SURGICAL HISTORY:  History of 2  sections , 2008, 2011    S/P tonsillectomy as a child     PAST SURGICAL HISTORY:  History of 2  sections , ,     S/P tonsillectomy as a child

## 2021-10-05 NOTE — H&P PST ADULT - NSICDXPASTMEDICALHX_GEN_ALL_CORE_FT
PAST MEDICAL HISTORY:  Anemia     Chronic lymphocytic leukemia     Gallbladder stone without cholecystitis or obstruction      PAST MEDICAL HISTORY:  Astigmatism B/L, wears eyeglasses    Chronic lymphocytic leukemia under observation; diagnosed 4 years - follows with oncologist Q 3 months    Gallbladder stone without cholecystitis or obstruction     Gluten intolerance      PAST MEDICAL HISTORY:  2019 novel coronavirus disease (COVID-19) Covid + 5/2021 - symptoms of loss of appetite, body ache, SOB, no fever x 1 week; No hospitalizations/No intubations - Managed conservatively at home    Astigmatism B/L, wears eyeglasses    Chronic lymphocytic leukemia under observation; diagnosed 4 years - follows with oncologist Q 3 months    Gallbladder stone without cholecystitis or obstruction     Gluten intolerance

## 2021-10-05 NOTE — H&P PST ADULT - HISTORY OF PRESENT ILLNESS
N/A     Covid PCR test scheduled for 10/16/21 at [facility]  Covid vaccine 2nd dose received   Denies Recent travel, Exposure or Covid symptoms  RUQ abdominal pain radiating to back lasting hours happening randomly not just when eating  started 12 years ago intermittently ; progressively worsening less than a year ago  no n/v/d  really bad will take tylenol doesnt help       Covid PCR test scheduled for 10/16/21 at [facility]  Did not receive the Covid vaccine   Denies Recent travel, Exposure or Covid symptoms   Covid + 5/2021 - symptoms of loss of appetite, body ache, SOB, no fever x 1 week; No hospitalizations/No intubations - Managed conservatively at home 36 year old female with PMH Gluten intolerance and CLL (Dx 4 years ago, no chemo/radiation, observation only) reports c/o sharp RUQ abdominal pain radiating to back intermittently x hours for over 12 years that has progressively been worsening within the last year. Symptoms not provoked by eating. Pt denies associated symptoms of nausea/vomiting/diarrhea. Pt reports that she will take extra-strength tylenol with no improvement in symptoms. Pt now presents to [facility] for scheduled laparoscopic cholecystectomy with Dr. Hopkins on 10/19/21.    Covid PCR test scheduled for 10/16/21 at [facility]  Did not receive the Covid vaccine   Denies Recent travel, Exposure or Covid symptoms   Covid + 5/2021 - symptoms of loss of appetite, body ache, SOB, no fever x 1 week; No hospitalizations/No intubations - Managed conservatively at home

## 2021-10-05 NOTE — H&P PST ADULT - PRIMARY CARE PROVIDER
Dr. Bethany Michael [phone] Dr. Bethany Michael [phone]; Last seen 2021 for routine check up

## 2021-10-05 NOTE — H&P PST ADULT - VISION (WITH CORRECTIVE LENSES IF THE PATIENT USUALLY WEARS THEM):
Corrective Glasses/Partially impaired: cannot see medication labels or newsprint, but can see obstacles in path, and the surrounding layout; can count fingers at arm's length

## 2021-10-05 NOTE — H&P PST ADULT - LAST STRESS TEST
possibly 2019 during CLL Checkup - as per patient, results were normal Mucosal Advancement Flap Text: Given the location of the defect, shape of the defect and the proximity to free margins a mucosal advancement flap was deemed most appropriate. Incisions were made with a 15 blade scalpel in the appropriate fashion along the cutaneous vermilion border and the mucosal lip. The remaining actinically damaged mucosal tissue was excised.  The mucosal advancement flap was then elevated to the gingival sulcus with care taken to preserve the neurovascular structures and advanced into the primary defect. Care was taken to ensure that precise realignment of the vermilion border was achieved.

## 2021-10-06 ENCOUNTER — NON-APPOINTMENT (OUTPATIENT)
Age: 36
End: 2021-10-06

## 2021-10-12 PROBLEM — U07.1 COVID-19: Chronic | Status: ACTIVE | Noted: 2021-10-05

## 2021-10-12 PROBLEM — K80.20 CALCULUS OF GALLBLADDER WITHOUT CHOLECYSTITIS WITHOUT OBSTRUCTION: Chronic | Status: ACTIVE | Noted: 2021-10-05

## 2021-10-12 PROBLEM — K90.41 NON-CELIAC GLUTEN SENSITIVITY: Chronic | Status: ACTIVE | Noted: 2021-10-05

## 2021-10-12 PROBLEM — H52.209 UNSPECIFIED ASTIGMATISM, UNSPECIFIED EYE: Chronic | Status: ACTIVE | Noted: 2021-10-05

## 2021-10-12 PROBLEM — C91.10 CHRONIC LYMPHOCYTIC LEUKEMIA OF B-CELL TYPE NOT HAVING ACHIEVED REMISSION: Chronic | Status: ACTIVE | Noted: 2020-08-20

## 2021-10-16 ENCOUNTER — OUTPATIENT (OUTPATIENT)
Dept: OUTPATIENT SERVICES | Facility: HOSPITAL | Age: 36
LOS: 1 days | End: 2021-10-16
Payer: MEDICAID

## 2021-10-16 DIAGNOSIS — Z11.52 ENCOUNTER FOR SCREENING FOR COVID-19: ICD-10-CM

## 2021-10-16 DIAGNOSIS — Z98.891 HISTORY OF UTERINE SCAR FROM PREVIOUS SURGERY: Chronic | ICD-10-CM

## 2021-10-16 DIAGNOSIS — Z90.89 ACQUIRED ABSENCE OF OTHER ORGANS: Chronic | ICD-10-CM

## 2021-10-16 LAB — SARS-COV-2 RNA SPEC QL NAA+PROBE: SIGNIFICANT CHANGE UP

## 2021-10-16 PROCEDURE — U0003: CPT

## 2021-10-16 PROCEDURE — C9803: CPT

## 2021-10-16 PROCEDURE — U0005: CPT

## 2021-10-18 ENCOUNTER — TRANSCRIPTION ENCOUNTER (OUTPATIENT)
Age: 36
End: 2021-10-18

## 2021-10-18 RX ORDER — OXYCODONE HYDROCHLORIDE 5 MG/1
5 TABLET ORAL ONCE
Refills: 0 | Status: DISCONTINUED | OUTPATIENT
Start: 2021-10-19 | End: 2021-10-19

## 2021-10-18 RX ORDER — SODIUM CHLORIDE 9 MG/ML
1000 INJECTION, SOLUTION INTRAVENOUS
Refills: 0 | Status: DISCONTINUED | OUTPATIENT
Start: 2021-10-19 | End: 2021-11-02

## 2021-10-18 RX ORDER — ONDANSETRON 8 MG/1
4 TABLET, FILM COATED ORAL ONCE
Refills: 0 | Status: COMPLETED | OUTPATIENT
Start: 2021-10-19 | End: 2021-10-19

## 2021-10-19 ENCOUNTER — RESULT REVIEW (OUTPATIENT)
Age: 36
End: 2021-10-19

## 2021-10-19 ENCOUNTER — OUTPATIENT (OUTPATIENT)
Dept: OUTPATIENT SERVICES | Facility: HOSPITAL | Age: 36
LOS: 1 days | End: 2021-10-19
Payer: MEDICAID

## 2021-10-19 VITALS
SYSTOLIC BLOOD PRESSURE: 117 MMHG | DIASTOLIC BLOOD PRESSURE: 65 MMHG | RESPIRATION RATE: 17 BRPM | HEART RATE: 71 BPM | OXYGEN SATURATION: 100 %

## 2021-10-19 VITALS
DIASTOLIC BLOOD PRESSURE: 63 MMHG | HEIGHT: 65 IN | RESPIRATION RATE: 18 BRPM | TEMPERATURE: 98 F | OXYGEN SATURATION: 100 % | SYSTOLIC BLOOD PRESSURE: 103 MMHG | WEIGHT: 141.1 LBS | HEART RATE: 77 BPM

## 2021-10-19 DIAGNOSIS — Z90.89 ACQUIRED ABSENCE OF OTHER ORGANS: Chronic | ICD-10-CM

## 2021-10-19 DIAGNOSIS — Z98.891 HISTORY OF UTERINE SCAR FROM PREVIOUS SURGERY: Chronic | ICD-10-CM

## 2021-10-19 DIAGNOSIS — K80.20 CALCULUS OF GALLBLADDER WITHOUT CHOLECYSTITIS WITHOUT OBSTRUCTION: ICD-10-CM

## 2021-10-19 PROCEDURE — 88341 IMHCHEM/IMCYTCHM EA ADD ANTB: CPT | Mod: 26,59

## 2021-10-19 PROCEDURE — 88342 IMHCHEM/IMCYTCHM 1ST ANTB: CPT | Mod: 26,59

## 2021-10-19 PROCEDURE — 88304 TISSUE EXAM BY PATHOLOGIST: CPT | Mod: 26

## 2021-10-19 PROCEDURE — G0452: CPT | Mod: 26

## 2021-10-19 PROCEDURE — 88360 TUMOR IMMUNOHISTOCHEM/MANUAL: CPT | Mod: 26

## 2021-10-19 RX ORDER — CHLORHEXIDINE GLUCONATE 213 G/1000ML
1 SOLUTION TOPICAL ONCE
Refills: 0 | Status: COMPLETED | OUTPATIENT
Start: 2021-10-19 | End: 2021-10-19

## 2021-10-19 RX ORDER — HYDROMORPHONE HYDROCHLORIDE 2 MG/ML
0.5 INJECTION INTRAMUSCULAR; INTRAVENOUS; SUBCUTANEOUS
Refills: 0 | Status: DISCONTINUED | OUTPATIENT
Start: 2021-10-19 | End: 2021-10-19

## 2021-10-19 RX ORDER — OXYCODONE HYDROCHLORIDE 5 MG/1
1 TABLET ORAL
Qty: 10 | Refills: 0
Start: 2021-10-19

## 2021-10-19 RX ORDER — APREPITANT 80 MG/1
40 CAPSULE ORAL ONCE
Refills: 0 | Status: COMPLETED | OUTPATIENT
Start: 2021-10-19 | End: 2021-10-19

## 2021-10-19 RX ORDER — CEFOTETAN DISODIUM 1 G
2 VIAL (EA) INJECTION ONCE
Refills: 0 | Status: COMPLETED | OUTPATIENT
Start: 2021-10-19 | End: 2021-10-19

## 2021-10-19 RX ADMIN — OXYCODONE HYDROCHLORIDE 5 MILLIGRAM(S): 5 TABLET ORAL at 17:05

## 2021-10-19 RX ADMIN — APREPITANT 40 MILLIGRAM(S): 80 CAPSULE ORAL at 13:15

## 2021-10-19 RX ADMIN — OXYCODONE HYDROCHLORIDE 5 MILLIGRAM(S): 5 TABLET ORAL at 16:35

## 2021-10-19 RX ADMIN — ONDANSETRON 4 MILLIGRAM(S): 8 TABLET, FILM COATED ORAL at 16:09

## 2021-10-19 RX ADMIN — HYDROMORPHONE HYDROCHLORIDE 0.5 MILLIGRAM(S): 2 INJECTION INTRAMUSCULAR; INTRAVENOUS; SUBCUTANEOUS at 16:50

## 2021-10-19 RX ADMIN — HYDROMORPHONE HYDROCHLORIDE 0.5 MILLIGRAM(S): 2 INJECTION INTRAMUSCULAR; INTRAVENOUS; SUBCUTANEOUS at 17:05

## 2021-10-19 RX ADMIN — HYDROMORPHONE HYDROCHLORIDE 0.5 MILLIGRAM(S): 2 INJECTION INTRAMUSCULAR; INTRAVENOUS; SUBCUTANEOUS at 16:30

## 2021-10-19 RX ADMIN — CHLORHEXIDINE GLUCONATE 1 APPLICATION(S): 213 SOLUTION TOPICAL at 11:51

## 2021-10-19 RX ADMIN — HYDROMORPHONE HYDROCHLORIDE 0.5 MILLIGRAM(S): 2 INJECTION INTRAMUSCULAR; INTRAVENOUS; SUBCUTANEOUS at 16:15

## 2021-10-19 NOTE — BRIEF OPERATIVE NOTE - OPERATION/FINDINGS
Laparoscopic cholecystectomy with critical view obtained.  Mild bile spillage that was copiously irrigated and suctioned.

## 2021-10-19 NOTE — ASU DISCHARGE PLAN (ADULT/PEDIATRIC) - ASU DC SPECIAL INSTRUCTIONSFT
Follow up with Dr. Corey Hopkins in 1 week, call to make an appointment.  No lifting greater than 15 lbs for at least 2 weeks.  You can shower tomorrow. Remove outer dressing in 48 hours. Do not submerge incisions for at least 2 weeks.  You can take tylenol and ibuprofen every 6 hours as needed for pain. There is also a short prescription of oxycodone sent to your pharmacy for severe pain.     Please take tylenol every 6 hours, and stagger with ibuprofen every 6 hours. This will allow you to alternate medications for more consistent pain control. For example: take a dose of tylenol at 8 am, then take a dose of ibuprofen at 11 am, then take a dose of tylenol at 2pm, and continue as needed. Follow up with Dr. Corey Hopkins in 2 week, call to make an appointment. [phone]  No lifting greater than 15 lbs for at least 2 weeks.  You can shower tomorrow. Remove outer dressing in 48 hours. Do not submerge incisions for at least 2 weeks.  You can take tylenol and ibuprofen every 6 hours as needed for pain. There is also a short prescription of oxycodone sent to your pharmacy for severe pain.     Please take tylenol every 6 hours, and stagger with ibuprofen every 6 hours. This will allow you to alternate medications for more consistent pain control. For example: take a dose of tylenol at 8 am, then take a dose of ibuprofen at 11 am, then take a dose of tylenol at 2pm, and continue as needed.

## 2021-10-19 NOTE — ASU PATIENT PROFILE, ADULT - NSICDXPASTMEDICALHX_GEN_ALL_CORE_FT
PAST MEDICAL HISTORY:  2019 novel coronavirus disease (COVID-19) Covid + 5/2021 - symptoms of loss of appetite, body ache, SOB, no fever x 1 week; No hospitalizations/No intubations - Managed conservatively at home    Astigmatism B/L, wears eyeglasses    Chronic lymphocytic leukemia under observation; diagnosed 4 years - follows with oncologist Q 3 months    Gallbladder stone without cholecystitis or obstruction     Gluten intolerance

## 2021-10-19 NOTE — ASU DISCHARGE PLAN (ADULT/PEDIATRIC) - CARE PROVIDER_API CALL
Corey Hopkins)  Surgery  [address], [address]  [place] [address]  Phone: [phone]  Fax: [phone]  Established Patient  Follow Up Time: 1 week

## 2021-10-19 NOTE — BRIEF OPERATIVE NOTE - NSICDXBRIEFPROCEDURE_GEN_ALL_CORE_FT
PROCEDURES:  Laparoscopic cholecystectomy using multiple ports 19-Oct-2021 15:48:12  Hilton Castañeda

## 2021-10-26 LAB — SURGICAL PATHOLOGY STUDY: SIGNIFICANT CHANGE UP

## 2021-11-01 ENCOUNTER — APPOINTMENT (OUTPATIENT)
Dept: OBGYN | Facility: CLINIC | Age: 36
End: 2021-11-01
Payer: MEDICAID

## 2021-11-01 VITALS — WEIGHT: 139 LBS | SYSTOLIC BLOOD PRESSURE: 120 MMHG | DIASTOLIC BLOOD PRESSURE: 74 MMHG | BODY MASS INDEX: 23.86 KG/M2

## 2021-11-01 PROCEDURE — 99395 PREV VISIT EST AGE 18-39: CPT

## 2021-11-01 RX ORDER — LEVONORGESTREL 52 MG/1
INTRAUTERINE DEVICE INTRAUTERINE
Refills: 0 | Status: ACTIVE | COMMUNITY

## 2021-11-01 NOTE — PROCEDURE
[Cervical Pap Smear] : cervical Pap smear [Liquid Base] : liquid base [Tolerated Well] : the patient tolerated the procedure well [No Complications] : there were no complications [de-identified] : GC Chlamydia NAAT

## 2021-11-01 NOTE — HISTORY OF PRESENT ILLNESS
[FreeTextEntry1] : The patient is a 36 y.o.  with Mirena IUD in place presenting today for an annual exam. She reports amenorrhea due to her IUD. She is doing well and is without complaints. She denies dysuria, urinary frequency, vaginal discharge, odor.\par \par She reports having a laparoscopic cholecystectomy 1 week ago and is very sore from it.  [Patient reported PAP Smear was abnormal] : Patient reported PAP Smear was abnormal [LMP unknown] : LMP unknown [Y] : Patient uses contraception [PapSmeardate] : 1/13/20 [TextBox_31] : HPV+ [PGHxTotal] : 3 [[facility]] : 3 [unknown] : Patient is unsure of the date of her LMP [No] : Patient does not have concerns regarding sex [Currently Active] : currently active [Men] : men

## 2021-11-01 NOTE — PLAN
[FreeTextEntry1] : 36 y.o.  presenting for annual exam\par -f/u pap smear, HPV, GC/CT\par -Bedside ultrasound performed. IUD in situ\par -Will need breast cancer screening with mammogram at age 42\par -f/u in 1 year

## 2021-11-01 NOTE — PHYSICAL EXAM
[Chaperone Present] : A chaperone was present in the examining room during all aspects of the physical examination [Appropriately responsive] : appropriately responsive [Alert] : alert [No Acute Distress] : no acute distress [Soft] : soft [Non-distended] : non-distended [No Lesions] : no lesions [No Mass] : no mass [Oriented x3] : oriented x3 [FreeTextEntry3] : supple [FreeTextEntry7] : Cholecystectomy scars visualized, well healed. Abdomen appropriately tender to palpation [Examination Of The Breasts] : a normal appearance [No Masses] : no breast masses were palpable [Labia Majora] : normal [Labia Minora] : normal [Scant] : There was scant vaginal bleeding [Normal] : normal [Uterine Adnexae] : normal [FreeTextEntry5] : IUD strings not visualized

## 2021-11-02 LAB
C TRACH RRNA SPEC QL NAA+PROBE: NOT DETECTED
HPV HIGH+LOW RISK DNA PNL CVX: NOT DETECTED
N GONORRHOEA RRNA SPEC QL NAA+PROBE: NOT DETECTED
SOURCE AMPLIFICATION: NORMAL

## 2021-11-06 LAB — CYTOLOGY CVX/VAG DOC THIN PREP: NORMAL

## 2021-11-11 PROCEDURE — 81264 IGK REARRANGEABN CLONAL POP: CPT

## 2021-11-11 PROCEDURE — 81342 TRG GENE REARRANGEMENT ANAL: CPT

## 2021-11-11 PROCEDURE — 47562 LAPAROSCOPIC CHOLECYSTECTOMY: CPT

## 2021-11-11 PROCEDURE — G0452: CPT | Mod: 26

## 2021-11-11 PROCEDURE — 88304 TISSUE EXAM BY PATHOLOGIST: CPT

## 2021-11-11 PROCEDURE — 88341 IMHCHEM/IMCYTCHM EA ADD ANTB: CPT

## 2021-11-11 PROCEDURE — 81340 TRB@ GENE REARRANGE AMPLIFY: CPT

## 2021-11-11 PROCEDURE — 88342 IMHCHEM/IMCYTCHM 1ST ANTB: CPT

## 2021-11-11 PROCEDURE — C9399: CPT

## 2021-11-11 PROCEDURE — 81261 IGH GENE REARRANGE AMP METH: CPT

## 2021-11-11 PROCEDURE — 88360 TUMOR IMMUNOHISTOCHEM/MANUAL: CPT

## 2021-11-11 PROCEDURE — C1889: CPT

## 2021-11-16 LAB
DNA PLOIDY SPEC FC-IMP: SIGNIFICANT CHANGE UP
DNA PLOIDY SPEC FC-IMP: SIGNIFICANT CHANGE UP

## 2021-12-08 ENCOUNTER — APPOINTMENT (OUTPATIENT)
Dept: HEMATOLOGY ONCOLOGY | Facility: CLINIC | Age: 36
End: 2021-12-08
Payer: MEDICAID

## 2021-12-08 ENCOUNTER — NON-APPOINTMENT (OUTPATIENT)
Age: 36
End: 2021-12-08

## 2021-12-08 VITALS
DIASTOLIC BLOOD PRESSURE: 70 MMHG | WEIGHT: 145 LBS | BODY MASS INDEX: 24.89 KG/M2 | SYSTOLIC BLOOD PRESSURE: 110 MMHG | HEART RATE: 83 BPM | OXYGEN SATURATION: 99 %

## 2021-12-08 PROCEDURE — 85025 COMPLETE CBC W/AUTO DIFF WBC: CPT

## 2021-12-08 PROCEDURE — 99215 OFFICE O/P EST HI 40 MIN: CPT

## 2021-12-18 LAB
ALBUMIN SERPL ELPH-MCNC: 4.9 G/DL
ALP BLD-CCNC: 41 U/L
ALT SERPL-CCNC: 18 U/L
ANION GAP SERPL CALC-SCNC: 10 MMOL/L
AST SERPL-CCNC: 14 U/L
BILIRUB SERPL-MCNC: 0.4 MG/DL
BUN SERPL-MCNC: 11 MG/DL
CALCIUM SERPL-MCNC: 9.5 MG/DL
CHLORIDE SERPL-SCNC: 105 MMOL/L
CO2 SERPL-SCNC: 28 MMOL/L
CREAT SERPL-MCNC: 0.68 MG/DL
GLUCOSE SERPL-MCNC: 102 MG/DL
LDH SERPL-CCNC: 129 U/L
MAGNESIUM SERPL-MCNC: 2.2 MG/DL
PHOSPHATE SERPL-MCNC: 3.7 MG/DL
POTASSIUM SERPL-SCNC: 4.2 MMOL/L
PROT SERPL-MCNC: 6.8 G/DL
SODIUM SERPL-SCNC: 143 MMOL/L
URATE SERPL-MCNC: 4.4 MG/DL

## 2021-12-18 NOTE — HISTORY OF PRESENT ILLNESS
[Disease:__________________________] : Disease: [unfilled] [de-identified] : Mrs. West is a 36 year old female with history of CLL- stage 0.  deletion 13 and mutated IGVH, non expression CD38, negative Zap70 which are all favorable (diagnosed in 2018).\par \par She was found to have elevated white blood cell count on routine blood work.  CBC on 7/1- WBC 11.2 ALC 7750 Hg 12.0 Plt 192.  She was seen by Dr. Subha Charles.   Peripheral blood flow cytometry was sent 8/24/17- revealed a population of B cells, positive for CD19, CD20,negative for CD5 and CD10 with no light chain restriction seen.  Labs sent reveal a normal SPEP/LAINE, k/l ration 1.71, B2 microglobulin 1.41, IgG 889, IgM 87,ESR<2.  She underwent a bone marrow biopsy on 9/26 which revealed a normocellular marrow, trilineage hematopoiesis, minimal involvement with CLL (10-15%)- IHC positive CD19, CD20, CD5, CD23.  CLL FISH revealed deletion 13, no evidence of del 11 or 17.  IGVH is mutated, CD38 is not expressed.  For some reason FISH for bcr-abl was sent- negative along with CALR which was also normal.  She had a PET/CT completed on 9/7 that revealed a 5 mm nodule in her LLL, otherwise no lymphadenopathy or splenomegaly.  She denies any previous medical history. \par \par Labs from 7/31/17 show Monos 0.8, EOS 0.4%, Lymph 69.4%, Hgb 12.0, Polys 29.0, HCT 38.1, WBC 11.2, Plt 192K. Labs from 6/19/16 show Monos 3.5, EOS 0.5%, Lymph 56.7%, Hgb 12.0, Polys 38.9, HCT 36.2, WBC 11.3, Plt 192K, Cr 0.7. \par \par \par \par \par \par  [de-identified] : 12/8/21: Pt presents today for follow up. She feels well overall and has no new complaints. She remains unvaccinated against COVID 19, but she had covid infection last year (same as her  and children). No fevers/chills. She had one episode of night sweats last night. Weight is stable. Appetite is good. She has no recent infections.   No new medications. Pt recently had cholecystectomy done 10/19/21

## 2021-12-18 NOTE — RESULTS/DATA
[FreeTextEntry1] : CBC 12/8/21\par \par ALC 16.76\par ANC 2.93\par Hgb 11.9\par Hct 36.0\par Plt 107

## 2021-12-18 NOTE — PHYSICAL EXAM
[Fully active, able to carry on all pre-disease performance without restriction] : Status 0 - Fully active, able to carry on all pre-disease performance without restriction [Normal] : affect appropriate [de-identified] : one small palpable in left cervical area

## 2021-12-18 NOTE — ASSESSMENT
[FreeTextEntry1] : This is a 36 year old female with lymphocytosis, bone marrow biopsy is consistent with chronic lymphocytic leukemia.  CT of her neck/chest with small lymph nodes, likely due to CLL- stage I. 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 \par \par #CLL\par CBC reviewed, counts remained stable, hgb 11.9 Plt 103\par No indications for treatment for her CLL- indications for treatment including cytopenias, symptomatic bulky disease- lymphadenopathy, splenomegaly \par Low IgM/IgA, no infections.  Would not pursue immunoglobulin replacement.  She will continue her work up with immunology.  \par \par patient aware that I will be leaving the institution and patient wishes to f/u with a doctor at [facility] as her mom also needs f/u there. I have connected her w dr Brad Goldberg who has agreed to see her in f/u.\par

## 2022-03-14 ENCOUNTER — OUTPATIENT (OUTPATIENT)
Dept: OUTPATIENT SERVICES | Facility: HOSPITAL | Age: 37
LOS: 1 days | Discharge: HOME | End: 2022-03-14

## 2022-03-14 DIAGNOSIS — Z90.89 ACQUIRED ABSENCE OF OTHER ORGANS: Chronic | ICD-10-CM

## 2022-03-14 DIAGNOSIS — Z98.891 HISTORY OF UTERINE SCAR FROM PREVIOUS SURGERY: Chronic | ICD-10-CM

## 2022-03-14 DIAGNOSIS — C91.10 CHRONIC LYMPHOCYTIC LEUKEMIA OF B-CELL TYPE NOT HAVING ACHIEVED REMISSION: ICD-10-CM

## 2022-03-15 ENCOUNTER — RESULT REVIEW (OUTPATIENT)
Age: 37
End: 2022-03-15

## 2022-03-15 ENCOUNTER — APPOINTMENT (OUTPATIENT)
Dept: HEMATOLOGY ONCOLOGY | Facility: CLINIC | Age: 37
End: 2022-03-15
Payer: MEDICAID

## 2022-03-15 VITALS
TEMPERATURE: 97.2 F | RESPIRATION RATE: 16 BRPM | DIASTOLIC BLOOD PRESSURE: 75 MMHG | HEART RATE: 76 BPM | HEIGHT: 64.96 IN | SYSTOLIC BLOOD PRESSURE: 120 MMHG | WEIGHT: 145.48 LBS | OXYGEN SATURATION: 99 % | BODY MASS INDEX: 24.24 KG/M2

## 2022-03-15 DIAGNOSIS — D80.4 SELECTIVE DEFICIENCY OF IMMUNOGLOBULIN A [IGA]: ICD-10-CM

## 2022-03-15 DIAGNOSIS — D72.820 LYMPHOCYTOSIS (SYMPTOMATIC): ICD-10-CM

## 2022-03-15 DIAGNOSIS — D80.2 SELECTIVE DEFICIENCY OF IMMUNOGLOBULIN A [IGA]: ICD-10-CM

## 2022-03-15 LAB
BASOPHILS # BLD AUTO: 0 K/UL — SIGNIFICANT CHANGE UP (ref 0–0.2)
BASOPHILS NFR BLD AUTO: 0 % — SIGNIFICANT CHANGE UP (ref 0–2)
EOSINOPHIL # BLD AUTO: 0.29 K/UL — SIGNIFICANT CHANGE UP (ref 0–0.5)
EOSINOPHIL NFR BLD AUTO: 1 % — SIGNIFICANT CHANGE UP (ref 0–6)
HCT VFR BLD CALC: 37.7 % — SIGNIFICANT CHANGE UP (ref 34.5–45)
HGB BLD-MCNC: 12.1 G/DL — SIGNIFICANT CHANGE UP (ref 11.5–15.5)
LYMPHOCYTES # BLD AUTO: 15.51 K/UL — HIGH (ref 1–3.3)
LYMPHOCYTES # BLD AUTO: 54 % — HIGH (ref 13–44)
LYMPHOCYTES # SPEC AUTO: 26 % — HIGH (ref 0–0)
MCHC RBC-ENTMCNC: 27.5 PG — SIGNIFICANT CHANGE UP (ref 27–34)
MCHC RBC-ENTMCNC: 32.1 G/DL — SIGNIFICANT CHANGE UP (ref 32–36)
MCV RBC AUTO: 85.7 FL — SIGNIFICANT CHANGE UP (ref 80–100)
MONOCYTES # BLD AUTO: 0.86 K/UL — SIGNIFICANT CHANGE UP (ref 0–0.9)
MONOCYTES NFR BLD AUTO: 3 % — SIGNIFICANT CHANGE UP (ref 2–14)
NEUTROPHILS # BLD AUTO: 4.6 K/UL — SIGNIFICANT CHANGE UP (ref 1.8–7.4)
NEUTROPHILS NFR BLD AUTO: 16 % — LOW (ref 43–77)
NRBC # BLD: 0 /100 — SIGNIFICANT CHANGE UP (ref 0–0)
NRBC # BLD: SIGNIFICANT CHANGE UP /100 WBCS (ref 0–0)
PLAT MORPH BLD: NORMAL — SIGNIFICANT CHANGE UP
PLATELET # BLD AUTO: 119 K/UL — LOW (ref 150–400)
RBC # BLD: 4.4 M/UL — SIGNIFICANT CHANGE UP (ref 3.8–5.2)
RBC # FLD: 12.9 % — SIGNIFICANT CHANGE UP (ref 10.3–14.5)
RBC BLD AUTO: SIGNIFICANT CHANGE UP
SMUDGE CELLS # BLD: PRESENT — SIGNIFICANT CHANGE UP
WBC # BLD: 28.73 K/UL — HIGH (ref 3.8–10.5)
WBC # FLD AUTO: 28.73 K/UL — HIGH (ref 3.8–10.5)

## 2022-03-15 PROCEDURE — 99215 OFFICE O/P EST HI 40 MIN: CPT

## 2022-03-15 NOTE — ASSESSMENT
[FreeTextEntry1] : This is a 36 year old female with lymphocytosis, bone marrow biopsy is consistent with chronic lymphocytic leukemia.  CT of her neck/chest with small lymph nodes, likely due to CLL- stage I. 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 \par \par #CLL\par CBC reviewed, lymphocytes are uptrending and platelets slightly lower. \par No indications for treatment for her CLL- indications for treatment including cytopenias, symptomatic bulky disease- lymphadenopathy, splenomegaly \par Patient's cervical lymphadenopathy seems worse to her - tender on exam, possibly reactive / inflammatory? Will follow up in three months as far as progression. No constitutional symptoms at this time. \par Low IgM/IgA, no infections at this point other than chronic cholecystitis which now is s/p cholecystectomy.  Would not pursue immunoglobulin replacement at this time. \par Patient understands and agrees with plan. All information explained to the best of my ability.\par RTC in 3 months.

## 2022-03-15 NOTE — PHYSICAL EXAM
[Fully active, able to carry on all pre-disease performance without restriction] : Status 0 - Fully active, able to carry on all pre-disease performance without restriction [Normal] : affect appropriate [de-identified] : one small palpable in left cervical area

## 2022-03-15 NOTE — HISTORY OF PRESENT ILLNESS
[Disease:__________________________] : Disease: [unfilled] [de-identified] : This is a previous patient of Dr. Deirdre Franco and then Dr. Tolbert. She transferred her care to me on 3/15/22. \par \par Mrs. West is a 36 year old female with history of CLL- stage 0.  deletion 13 and mutated IGVH, non expression CD38, negative Zap70 which are all favorable (diagnosed in 2018).\par \par She was found to have elevated white blood cell count on routine blood work.  CBC on 7/1- WBC 11.2 ALC 7750 Hg 12.0 Plt 192.  She was seen by Dr. Subha Charles.   Peripheral blood flow cytometry was sent 8/24/17- revealed a population of B cells, positive for CD19, CD20,negative for CD5 and CD10 with no light chain restriction seen.  Labs sent reveal a normal SPEP/LAINE, k/l ration 1.71, B2 microglobulin 1.41, IgG 889, IgM 87,ESR<2.  She underwent a bone marrow biopsy on 9/26 which revealed a normocellular marrow, trilineage hematopoiesis, minimal involvement with CLL (10-15%)- IHC positive CD19, CD20, CD5, CD23.  CLL FISH revealed deletion 13, no evidence of del 11 or 17.  IGVH is mutated, CD38 is not expressed.  For some reason FISH for bcr-abl was sent- negative along with CALR which was also normal.  She had a PET/CT completed on 9/7 that revealed a 5 mm nodule in her LLL, otherwise no lymphadenopathy or splenomegaly.  She denies any previous medical history.  [de-identified] : Cholecystectomy in October 2021. \par \par She reported on initial visit with me on 3/15 that she has noticed she has possibly had growing in the cervical lymph nodes on the left side. She denies any night sweats, but she does sweat more than usual. She has always been like that however. No chest pain or palpitations, no shortness of breath. No fevers. Her weight has been stable. Appetite is good. No N/V/D, regular bowel movements. She is urinating normally. No recent infections. \par

## 2022-03-16 LAB
MANUAL DIF COMMENT BLD-IMP: SIGNIFICANT CHANGE UP

## 2022-03-23 LAB
ALBUMIN SERPL ELPH-MCNC: 5 G/DL
ALP BLD-CCNC: 42 U/L
ALT SERPL-CCNC: 22 U/L
ANION GAP SERPL CALC-SCNC: 14 MMOL/L
AST SERPL-CCNC: 17 U/L
BILIRUB SERPL-MCNC: 0.3 MG/DL
BUN SERPL-MCNC: 16 MG/DL
CALCIUM SERPL-MCNC: 9.4 MG/DL
CHLORIDE SERPL-SCNC: 103 MMOL/L
CO2 SERPL-SCNC: 25 MMOL/L
CREAT SERPL-MCNC: 0.84 MG/DL
EGFR: 93 ML/MIN/1.73M2
GLUCOSE SERPL-MCNC: 94 MG/DL
LDH SERPL-CCNC: 151 U/L
POTASSIUM SERPL-SCNC: 4.2 MMOL/L
PROT SERPL-MCNC: 6.9 G/DL
SODIUM SERPL-SCNC: 141 MMOL/L

## 2022-06-07 ENCOUNTER — OUTPATIENT (OUTPATIENT)
Dept: OUTPATIENT SERVICES | Facility: HOSPITAL | Age: 37
LOS: 1 days | Discharge: HOME | End: 2022-06-07

## 2022-06-07 DIAGNOSIS — C91.10 CHRONIC LYMPHOCYTIC LEUKEMIA OF B-CELL TYPE NOT HAVING ACHIEVED REMISSION: ICD-10-CM

## 2022-06-07 DIAGNOSIS — Z98.891 HISTORY OF UTERINE SCAR FROM PREVIOUS SURGERY: Chronic | ICD-10-CM

## 2022-06-07 DIAGNOSIS — Z90.89 ACQUIRED ABSENCE OF OTHER ORGANS: Chronic | ICD-10-CM

## 2022-06-07 NOTE — H&P PST ADULT - GENERAL
Post-Care Instructions: I reviewed with the patient in detail post-care instructions. Patient is to wear sunprotection, and avoid picking at any of the treated lesions. Pt may apply Vaseline to crusted or scabbing areas. Show Aperture Variable?: Yes Render Note In Bullet Format When Appropriate: No Consent: The patient's consent was obtained including but not limited to risks of crusting, scabbing, blistering, scarring, darker or lighter pigmentary change, recurrence, incomplete removal and infection. Detail Level: Detailed Duration Of Freeze Thaw-Cycle (Seconds): 0 details…

## 2022-06-14 ENCOUNTER — APPOINTMENT (OUTPATIENT)
Dept: HEMATOLOGY ONCOLOGY | Facility: CLINIC | Age: 37
End: 2022-06-14

## 2022-06-15 ENCOUNTER — RESULT REVIEW (OUTPATIENT)
Age: 37
End: 2022-06-15

## 2022-06-15 ENCOUNTER — APPOINTMENT (OUTPATIENT)
Dept: HEMATOLOGY ONCOLOGY | Facility: CLINIC | Age: 37
End: 2022-06-15
Payer: MEDICAID

## 2022-06-15 VITALS
HEART RATE: 75 BPM | OXYGEN SATURATION: 98 % | SYSTOLIC BLOOD PRESSURE: 103 MMHG | RESPIRATION RATE: 16 BRPM | TEMPERATURE: 97.5 F | WEIGHT: 146.59 LBS | DIASTOLIC BLOOD PRESSURE: 62 MMHG | BODY MASS INDEX: 24.43 KG/M2

## 2022-06-15 LAB
BASOPHILS # BLD AUTO: 0 K/UL — SIGNIFICANT CHANGE UP (ref 0–0.2)
BASOPHILS NFR BLD AUTO: 0 % — SIGNIFICANT CHANGE UP (ref 0–2)
EOSINOPHIL # BLD AUTO: 0.26 K/UL — SIGNIFICANT CHANGE UP (ref 0–0.5)
EOSINOPHIL NFR BLD AUTO: 1 % — SIGNIFICANT CHANGE UP (ref 0–6)
HCT VFR BLD CALC: 36 % — SIGNIFICANT CHANGE UP (ref 34.5–45)
HGB BLD-MCNC: 11.7 G/DL — SIGNIFICANT CHANGE UP (ref 11.5–15.5)
LYMPHOCYTES # BLD AUTO: 15.48 K/UL — HIGH (ref 1–3.3)
LYMPHOCYTES # BLD AUTO: 60 % — HIGH (ref 13–44)
LYMPHOCYTES # SPEC AUTO: 26 % — HIGH (ref 0–0)
MCHC RBC-ENTMCNC: 27.7 PG — SIGNIFICANT CHANGE UP (ref 27–34)
MCHC RBC-ENTMCNC: 32.5 G/DL — SIGNIFICANT CHANGE UP (ref 32–36)
MCV RBC AUTO: 85.1 FL — SIGNIFICANT CHANGE UP (ref 80–100)
MONOCYTES # BLD AUTO: 0 K/UL — SIGNIFICANT CHANGE UP (ref 0–0.9)
MONOCYTES NFR BLD AUTO: 0 % — LOW (ref 2–14)
NEUTROPHILS # BLD AUTO: 3.35 K/UL — SIGNIFICANT CHANGE UP (ref 1.8–7.4)
NEUTROPHILS NFR BLD AUTO: 13 % — LOW (ref 43–77)
NRBC # BLD: 0 /100 — SIGNIFICANT CHANGE UP (ref 0–0)
NRBC # BLD: SIGNIFICANT CHANGE UP /100 WBCS (ref 0–0)
PLAT MORPH BLD: NORMAL — SIGNIFICANT CHANGE UP
PLATELET # BLD AUTO: 94 K/UL — LOW (ref 150–400)
RBC # BLD: 4.23 M/UL — SIGNIFICANT CHANGE UP (ref 3.8–5.2)
RBC # FLD: 12.4 % — SIGNIFICANT CHANGE UP (ref 10.3–14.5)
RBC BLD AUTO: SIGNIFICANT CHANGE UP
SMUDGE CELLS # BLD: PRESENT — SIGNIFICANT CHANGE UP
WBC # BLD: 25.8 K/UL — HIGH (ref 3.8–10.5)
WBC # FLD AUTO: 25.8 K/UL — HIGH (ref 3.8–10.5)

## 2022-06-15 PROCEDURE — 99214 OFFICE O/P EST MOD 30 MIN: CPT

## 2022-06-15 RX ORDER — PREDNISONE 20 MG/1
20 TABLET ORAL
Qty: 5 | Refills: 0 | Status: COMPLETED | COMMUNITY
Start: 2022-04-24

## 2022-06-15 RX ORDER — NEOMYCIN AND POLYMYXIN B SULFATES AND DEXAMETHASONE 3.5; 10000; 1 MG/G; [IU]/G; MG/G
3.5-10000-0.1 OINTMENT OPHTHALMIC
Qty: 4 | Refills: 0 | Status: COMPLETED | COMMUNITY
Start: 2022-03-23

## 2022-06-15 RX ORDER — MAGNESIUM OXIDE 241.3 MG/1000MG
400 TABLET ORAL
Qty: 30 | Refills: 0 | Status: COMPLETED | COMMUNITY
Start: 2022-02-06

## 2022-06-15 RX ORDER — PROMETHAZINE HYDROCHLORIDE 6.25 MG/5ML
6.25 SOLUTION ORAL
Qty: 100 | Refills: 0 | Status: COMPLETED | COMMUNITY
Start: 2022-05-02

## 2022-06-15 NOTE — ASSESSMENT
[FreeTextEntry1] : This is a 36 year old female with lymphocytosis, bone marrow biopsy is consistent with chronic lymphocytic leukemia.  CT of her neck/chest with small lymph nodes, likely due to CLL- stage I. 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 \par \par #CLL\par CBC reviewed, lymphocytes are stable today but platelets a little bit lower down to 94K. No bleeding complications at this time. \par No indications for treatment for her CLL- indications for treatment including progressive cytopenias, symptomatic bulky disease- lymphadenopathy, splenomegaly, or consitutional symptoms ("B symptoms") \par Patient's cervical lymphadenopathy stable on exam with possibly one new subcentimeter node. Shotty LN bilateral inguinal. \par Low IgM/IgA, no infections at this point other than chronic cholecystitis which now is s/p cholecystectomy and mild recurrent bronchitis. No pneumonia, no fevers. Discussed immunoglobulin replacement and the possible side effects and she would like to avoid it for now. Would not pursue immunoglobulin replacement at this time. \par Prescribed benzonatate for cough. \par Patient understands and agrees with plan. All information explained to the best of my ability.\par RTC in 3 months.

## 2022-06-15 NOTE — PHYSICAL EXAM
[Fully active, able to carry on all pre-disease performance without restriction] : Status 0 - Fully active, able to carry on all pre-disease performance without restriction [Normal] : affect appropriate [de-identified] : Small upper anterior L cervical LN, posterior cervical Left LN new <1cm.(posterior auricular LN).  L posterior cervical LN all the way at the bottom of the chain ~2cm but stable.

## 2022-06-17 LAB
ALBUMIN SERPL ELPH-MCNC: 5 G/DL
ALP BLD-CCNC: 44 U/L
ALT SERPL-CCNC: 26 U/L
ANION GAP SERPL CALC-SCNC: 13 MMOL/L
AST SERPL-CCNC: 20 U/L
BILIRUB SERPL-MCNC: 0.4 MG/DL
BUN SERPL-MCNC: 14 MG/DL
CALCIUM SERPL-MCNC: 9.6 MG/DL
CHLORIDE SERPL-SCNC: 105 MMOL/L
CO2 SERPL-SCNC: 27 MMOL/L
CREAT SERPL-MCNC: 0.65 MG/DL
DEPRECATED KAPPA LC FREE/LAMBDA SER: 7.88 RATIO
EGFR: 118 ML/MIN/1.73M2
GLUCOSE SERPL-MCNC: 106 MG/DL
IGA SER QL IEP: 38 MG/DL
IGG SER QL IEP: 667 MG/DL
IGM SER QL IEP: 17 MG/DL
KAPPA LC CSF-MCNC: 0.48 MG/DL
KAPPA LC SERPL-MCNC: 3.78 MG/DL
POTASSIUM SERPL-SCNC: 4.1 MMOL/L
PROT SERPL-MCNC: 6.6 G/DL
SODIUM SERPL-SCNC: 144 MMOL/L

## 2022-08-05 NOTE — OB RN DELIVERY SUMMARY - AMNIOTIC FLUID ODOR, LABOR
(Enmanuel Rand is a 70 y.o. female (: 1943) presenting to address:    Chief Complaint   Patient presents with    Annual Wellness Visit     [address]       Vitals:    22 1102 22 1110   BP: (!) 140/81 (!) 140/72   Pulse: 71    Resp: 16    Temp: 97.6 °F (36.4 °C)    TempSrc: Temporal    SpO2: 95%    Weight: 119 lb (54 kg)    Height: 4' 11.1\" (1.501 m)        Hearing/Vision:     Vision Screening    Right eye Left eye Both eyes   Without correction      With correction 20/70 20/70 20/70       Learning Assessment:     Learning Assessment 2015   PRIMARY LEARNER Patient   HIGHEST LEVEL OF EDUCATION - PRIMARY LEARNER  GRADUATED HIGH SCHOOL OR GED   BARRIERS PRIMARY LEARNER NONE   CO-LEARNER CAREGIVER No   PRIMARY LANGUAGE OTHER (COMMENT)    NEED No   LEARNER PREFERENCE PRIMARY READING   LEARNING SPECIAL TOPICS none   ANSWERED BY patient   RELATIONSHIP SELF   ASSESSMENT COMMENT n/a     Depression Screening:     3 most recent PHQ Screens 2022   Little interest or pleasure in doing things Not at all   Feeling down, depressed, irritable, or hopeless Not at all   Total Score PHQ 2 0     Fall Risk Assessment:     Fall Risk Assessment, last 12 mths 2022   Able to walk? Yes   Fall in past 12 months? 0   Do you feel unsteady? -   Are you worried about falling -     Abuse Screening:     Abuse Screening Questionnaire 2022   Do you ever feel afraid of your partner? N   Are you in a relationship with someone who physically or mentally threatens you? N   Is it safe for you to go home?  Y     ADL Assessment:     ADL Assessment 2022   Feeding yourself No Help Needed   Getting from bed to chair No Help Needed   Getting dressed No Help Needed   Bathing or showering No Help Needed   Walk across the room (includes cane/walker) No Help Needed   Using the telphone No Help Needed   Taking your medications No Help Needed   Preparing meals No Help Needed   Managing money (expenses/bills) No Help Needed Moderately strenuous housework (laundry) No Help Needed   Shopping for personal items (toiletries/medicines) No Help Needed   Shopping for groceries No Help Needed   Driving No Help Needed   Climbing a flight of stairs No Help Needed   Getting to places beyond walking distances No Help Needed        Coordination of Care Questionaire:   1. \"Have you been to the ER, urgent care clinic since your last visit? Hospitalized since your last visit? \" No    2. \"Have you seen or consulted any other health care providers outside of the [address] [place] [address] [facility] since your last visit? \" No     3. For patients aged 39-70: Has the patient had a colonoscopy? No     If the patient is female:    4. For patients aged 41-77: Has the patient had a mammogram within the past 2 years? NA - based on age    11. For patients aged 21-65: Has the patient had a pap smear? NA - based on age    Advanced Directive:   1. Do you have an Advanced Directive? NO    2. Would you like information on Advanced Directives?  NO normal

## 2022-08-22 ENCOUNTER — APPOINTMENT (OUTPATIENT)
Dept: OBGYN | Facility: CLINIC | Age: 37
End: 2022-08-22

## 2022-09-16 NOTE — OB PST NOTE - BIRTH SEX
"  New Patient     The patient location is: [place]  The chief complaint leading to consultation is: headache     Visit type: audiovisual    Face to Face time with patient: 30 min  40 minutes of total time spent on the encounter, which includes face to face time and non-face to face time preparing to see the patient (eg, review of tests), Obtaining and/or reviewing separately obtained history, Documenting clinical information in the electronic or other health record, Independently interpreting results (not separately reported) and communicating results to the patient/family/caregiver, or Care coordination (not separately reported).     Each patient to whom he or she provides medical services by telemedicine is:  (1) informed of the relationship between the physician and patient and the respective role of any other health care provider with respect to management of the patient; and (2) notified that he or she may decline to receive medical services by telemedicine and may withdraw from such care at any time.    Notes:       SUBJECTIVE:  Patient ID: Mar Luna   MRN: [id number]  Referred By: No ref. provider found  Chief Complaint: Headache      History of Present Illness:   25 y.o. female with migraines, asthma, who presents to virtual visit alone for evaluation of headaches.   PMHx negative for TBI, Meningitis, Aneurysms, Kidney Stones, GI bleed, osteoporosis, CAD/MI, CVA/TIA, DM, cancer  Family Hx + for Migraines in mother    Pt has been suffering from migraines since her childhood. She was previously seen by Dr. Kwok, is new to me. She did have improvement on elavil 75mg qhs and imitrex 50mg prn. But discontinued these meds 2/2 pregnancy. Since that time, pt has since continued imitrex 50mg prn but would like to talk about alternatives since this "makes me feel bad". 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 She recalls experiencing black " "dots in her vision prior to her migraines but no longer experiences that since starting elavil or celexa. Pt is also considering family planning.   Location/Radiation - unilateral R>L frontotemporalis to holocephalic  Quality - throbbing, pulsating, pressure  Duration - few hours - 3 days  Intensity (range) - mild - severe  Frequency - 8/30 ha days per month  Triggers - panic attacks, get upset, caffeine, chocolate, menstrual cycles, weather changes  Aggravating Factors - noise, light, exertion  Alleviating Factors - epsom salt bath, eucalyptus, ice pack  Prodrome/Aura - black dots in vision  Associated symptoms with the headache: photophobia, phonophobia, nausea    Treatments Tried   Elavil  Propranolol  Celexa   Fioricet  Imitrex 50mg tab - helps, "makes me feel bad"  Maxalt 10mg  Ibuprofen 800mg - sometimes helps    Social History  Alcohol - denies  Smoke - denies  Recreational Drug Use- marijuana use daily    Current Medications:    Current Outpatient Medications:     amLODIPine (NORVASC) 5 MG tablet, Take 1 tablet (5 mg total) by mouth once daily. (Patient not taking: Reported on 2/9/2022), Disp: 30 tablet, Rfl: 0    citalopram (CELEXA) 20 MG tablet, Take 1 tablet (20 mg total) by mouth once daily., Disp: 90 tablet, Rfl: 3    citalopram (CELEXA) 20 MG tablet, Take 1 tablet (20 mg total) by mouth once daily., Disp: 90 tablet, Rfl: 3    magnesium oxide (MAGOX) 400 mg (241.3 mg magnesium) tablet, Take 1 tablet (400 mg total) by mouth once daily., Disp: 30 tablet, Rfl: 3    rizatriptan (MAXALT-MLT) 5 MG disintegrating tablet, Take at onset of migraine, can repeat in 2 hrs if needed.  No more than 2 tabs per day or 3 days/wk., Disp: 12 tablet, Rfl: 3    valACYclovir (VALTREX) 1000 MG tablet, Take 0.5 tablets (500 mg total) by mouth once daily., Disp: 45 tablet, Rfl: 3    Review of Systems - as per HPI, otherwise a balanced 10 systems review is negative.    OBJECTIVE:  Vitals:  There were no vitals taken for this " visit.    Physical Exam: certain limitations due to video visit platform, able to perform the following with the patient's assistance:  Constitutional: she appears well-developed and well-nourished. she is well groomed. NAD  HENT:    Head: Normocephalic and atraumatic,  Frontalis was NTTP, temporalis was NTTP   Eyes: Conjunctivae and EOM are normal. Pupils are equal and round  Neck: Occiput and trapezius NTTP   Musculoskeletal: Normal range of motion.   Psychiatric: Normal mood and affect.     Neuro exam:    Mental status:  The patient is alert and oriented to person, place and time.  Language is intact and fluent  Remote and recent memory are intact  Normal attention and concentration  Mood is stable    Cranial Nerves:  Extraocular movements are intact and without nystagmus.    Facial movement is symmetric.  Facial sensation is intact.    Tongue in midline without fasciculation.   FROM of neck in all (6) directions without pain  Shoulder shrug symmetrical.    Coordination:     Finger to nose - normal and symmetric bilaterally     Motor:  Normal muscle bulk and symmetry. No fasciculations were noted.   Tremor not apparent   Pronator drift not apparent.                          Sensory:  RUE  intact light touch  LUE intact light touch  RLE intact light touch  LLE intact light touch    Gait:   Normal gait    Review of Data:   Notes from neuro reviewed   Labs:  No visits with results within 3 Month(s) from this visit.   Latest known visit with results is:   Office Visit on 02/09/2022   Component Date Value Ref Range Status    POC Rapid COVID 02/09/2022 Negative  Negative Final     Acceptable 02/09/2022 Yes   Final    Molecular Strep A, POC 02/09/2022 Negative  Negative Final     Acceptable 02/09/2022 Yes   Final     Imaging:  Results for orders placed or performed during the hospital encounter of 11/21/16   CT Head Without Contrast    Narrative    CT head without.    Findings: The brain is  normally formed and unremarkable.  The ventricular system is within normal limits of size for age and shows no distortion by mass effect.  There is no intra-or extra-axial mass or hemorrhage.  The visualized extracranial structures are unremarkable.    Impression     No acute intracranial abnormality.      Electronically signed by: TIM MCGOWAN MD  Date:     11/21/16  Time:    11:31      Note: I have independently reviewed any/all imaging/labs/tests and agree with the report (s) as documented.  Any discrepancies will be as noted/demarcated by free text.  YESENIA PAPPAS 9/16/2022    ASSESSMENT:  1. Migraine with aura and without status migrainosus, not intractable    2. Uncomplicated asthma, unspecified asthma severity, unspecified whether persistent          PLAN:  - Discussed symptoms appear to be consistent with migraines. Discussed this with patient along with treatment options and patient agreed with the following plan  - abortive - try maxalt (if fails consider amerge) vs naproxen  - nausea - defers  - pt to inform clinic of any family planning status changes. Advised d/c of these meds if status changes. Discussed during visit today.   - asthma - avoid bb, mgmt per pcp  - risks, benefits, and potential side effects of maxalt, naproxen, gepants discussed   - alternative treatment options offered   - importance of healthy diet, regular exercise and sleep hygiene in the treatment of headaches    - Start tracking headaches via Migraine Johan torey on phone   - RTC in 3 mo or sooner if needed     Orders Placed This Encounter    rizatriptan (MAXALT-MLT) 5 MG disintegrating tablet       I have discussed realistic goals of care with patient at length as well as medication options, and need for lifestyle adjustment. I have explained that treatment will take time. We have agreed that the goal will be to reduce frequency/intensity/quantity of HA, not to be completely HA free. I have explained my non narcotic policy regarding  headache treatment.    Patient agreeable to work on lifestyle adjustments.    Discussed potential for teratogenicity with treatment, patient understands if her family planning status should change she will contact office immediately and we will safely adjust medications as needed.     Questions and concerns were sought and answered to the patient's stated verbal satisfaction.  The patient verbalizes understanding and agreement with the above stated treatment plan.     CC: DO Hue Vázquez PA-C  [facility]  [phone]    Dr. Lee was available during today's encounter.      Female

## 2022-10-05 NOTE — H&P PST ADULT - NS PRO TALK SOMEONE YN
Spoke with Shi in lab regarding adding on an everolimus level to today's labs.  She verbalized understanding.  
no

## 2022-12-06 ENCOUNTER — APPOINTMENT (OUTPATIENT)
Dept: HEMATOLOGY ONCOLOGY | Facility: CLINIC | Age: 37
End: 2022-12-06

## 2022-12-16 ENCOUNTER — OUTPATIENT (OUTPATIENT)
Dept: OUTPATIENT SERVICES | Facility: HOSPITAL | Age: 37
LOS: 1 days | Discharge: HOME | End: 2022-12-16

## 2022-12-16 DIAGNOSIS — Z98.891 HISTORY OF UTERINE SCAR FROM PREVIOUS SURGERY: Chronic | ICD-10-CM

## 2022-12-16 DIAGNOSIS — C91.10 CHRONIC LYMPHOCYTIC LEUKEMIA OF B-CELL TYPE NOT HAVING ACHIEVED REMISSION: ICD-10-CM

## 2022-12-16 DIAGNOSIS — Z90.89 ACQUIRED ABSENCE OF OTHER ORGANS: Chronic | ICD-10-CM

## 2022-12-23 ENCOUNTER — APPOINTMENT (OUTPATIENT)
Dept: HEMATOLOGY ONCOLOGY | Facility: CLINIC | Age: 37
End: 2022-12-23

## 2023-01-18 ENCOUNTER — RESULT REVIEW (OUTPATIENT)
Age: 38
End: 2023-01-18

## 2023-01-18 ENCOUNTER — APPOINTMENT (OUTPATIENT)
Dept: HEMATOLOGY ONCOLOGY | Facility: CLINIC | Age: 38
End: 2023-01-18
Payer: MEDICAID

## 2023-01-18 VITALS
HEART RATE: 74 BPM | SYSTOLIC BLOOD PRESSURE: 127 MMHG | OXYGEN SATURATION: 99 % | RESPIRATION RATE: 16 BRPM | WEIGHT: 147.93 LBS | DIASTOLIC BLOOD PRESSURE: 71 MMHG | BODY MASS INDEX: 24.65 KG/M2 | TEMPERATURE: 97.3 F

## 2023-01-18 DIAGNOSIS — C91.10 CHRONIC LYMPHOCYTIC LEUKEMIA OF B-CELL TYPE NOT HAVING ACHIEVED REMISSION: ICD-10-CM

## 2023-01-18 LAB
BASOPHILS # BLD AUTO: 0 K/UL — SIGNIFICANT CHANGE UP (ref 0–0.2)
BASOPHILS NFR BLD AUTO: 0 % — SIGNIFICANT CHANGE UP (ref 0–2)
EOSINOPHIL # BLD AUTO: 0 K/UL — SIGNIFICANT CHANGE UP (ref 0–0.5)
EOSINOPHIL NFR BLD AUTO: 0 % — SIGNIFICANT CHANGE UP (ref 0–6)
HCT VFR BLD CALC: 35.2 % — SIGNIFICANT CHANGE UP (ref 34.5–45)
HGB BLD-MCNC: 11.5 G/DL — SIGNIFICANT CHANGE UP (ref 11.5–15.5)
LYMPHOCYTES # BLD AUTO: 20.01 K/UL — HIGH (ref 1–3.3)
LYMPHOCYTES # BLD AUTO: 64 % — HIGH (ref 13–44)
LYMPHOCYTES # SPEC AUTO: 14 % — HIGH (ref 0–0)
MCHC RBC-ENTMCNC: 27.2 PG — SIGNIFICANT CHANGE UP (ref 27–34)
MCHC RBC-ENTMCNC: 32.7 G/DL — SIGNIFICANT CHANGE UP (ref 32–36)
MCV RBC AUTO: 83.2 FL — SIGNIFICANT CHANGE UP (ref 80–100)
MONOCYTES # BLD AUTO: 1.25 K/UL — HIGH (ref 0–0.9)
MONOCYTES NFR BLD AUTO: 4 % — SIGNIFICANT CHANGE UP (ref 2–14)
NEUTROPHILS # BLD AUTO: 5.63 K/UL — SIGNIFICANT CHANGE UP (ref 1.8–7.4)
NEUTROPHILS NFR BLD AUTO: 18 % — LOW (ref 43–77)
NRBC # BLD: 0 /100 — SIGNIFICANT CHANGE UP (ref 0–0)
NRBC # BLD: SIGNIFICANT CHANGE UP /100 WBCS (ref 0–0)
PLAT MORPH BLD: NORMAL — SIGNIFICANT CHANGE UP
PLATELET # BLD AUTO: 89 K/UL — LOW (ref 150–400)
RBC # BLD: 4.23 M/UL — SIGNIFICANT CHANGE UP (ref 3.8–5.2)
RBC # FLD: 12.8 % — SIGNIFICANT CHANGE UP (ref 10.3–14.5)
RBC BLD AUTO: SIGNIFICANT CHANGE UP
SMUDGE CELLS # BLD: PRESENT — SIGNIFICANT CHANGE UP
WBC # BLD: 31.27 K/UL — HIGH (ref 3.8–10.5)
WBC # FLD AUTO: 31.27 K/UL — HIGH (ref 3.8–10.5)

## 2023-01-18 PROCEDURE — 99213 OFFICE O/P EST LOW 20 MIN: CPT

## 2023-01-18 RX ORDER — BENZONATATE 100 MG/1
100 CAPSULE ORAL 3 TIMES DAILY
Qty: 90 | Refills: 3 | Status: DISCONTINUED | COMMUNITY
Start: 2022-06-15 | End: 2023-01-18

## 2023-01-18 RX ORDER — ALBUTEROL SULFATE 90 UG/1
108 (90 BASE) INHALANT RESPIRATORY (INHALATION)
Qty: 8 | Refills: 0 | Status: DISCONTINUED | COMMUNITY
Start: 2022-05-02 | End: 2023-01-18

## 2023-01-18 NOTE — ASSESSMENT
[FreeTextEntry1] : This is a 37 year old female with lymphocytosis, bone marrow biopsy is consistent with chronic lymphocytic leukemia.  CT of her neck/chest with small lymph nodes, likely due to CLL- stage I. 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 \par \par #CLL\par CBC reviewed, lymphocytes are stable today but platelets a little bit lower down to 89K. No bleeding complications at this time. \par Patient with recent increase in prior LAD. Unsure if in the setting of recent COVID infection or CLL progression. Will re-assess at next visit and determine need for CLL treatment.\par Hx of  low IgM/IgA, no infections at this point other than chronic cholecystitis which now is s/p cholecystectomy and mild recurrent bronchitis. No pneumonia, no fevers. Discussed immunoglobulin replacement and the possible side effects and she would like to avoid it for now. Would not pursue immunoglobulin replacement at this time. \par Discussed getting COVID vaccine in  a few months (delay due to recent COVID infection).\par Patient understands and agrees with plan. All information explained to the best of my ability.\par RTC in 6 months or sooner PRN\par \par \par Patient seen and discussed with Dr. Noriega\par Blossom Stephens MD PGY5 Heme/Onc Fellow

## 2023-01-18 NOTE — PHYSICAL EXAM
[Fully active, able to carry on all pre-disease performance without restriction] : Status 0 - Fully active, able to carry on all pre-disease performance without restriction [Normal] : affect appropriate [de-identified] : Small upper anterior L cervical LN, posterior cervical Left LN (posterior auricular LN).  L posterior cervical LN all the way at the bottom of the chain ~2cm but stable. new R supraclavicular LAD

## 2023-01-18 NOTE — REASON FOR VISIT
[Follow-Up Visit] : a follow-up visit for [CLL] : chronic lymphocytic leukemia [Spouse] : spouse [FreeTextEntry2] : Lymphocytosis

## 2023-01-18 NOTE — HISTORY OF PRESENT ILLNESS
[Disease:__________________________] : Disease: [unfilled] [ECOG Performance Status: 0 - Fully active, able to carry on all pre-disease performance without restriction] : Performance Status: 0 - Fully active, able to carry on all pre-disease performance without restriction [de-identified] : This is a previous patient of Dr. Deirdre Franco and then Dr. Tolbert. She transferred her care to me on 3/15/22. \par \par Mrs. West is a 36 year old female with history of CLL- stage 0.  deletion 13 and mutated IGVH, non expression CD38, negative Zap70 which are all favorable (diagnosed in 2018).\par \par She was found to have elevated white blood cell count on routine blood work.  CBC on 7/1- WBC 11.2 ALC 7750 Hg 12.0 Plt 192.  She was seen by Dr. Subha Charles.   Peripheral blood flow cytometry was sent 8/24/17- revealed a population of B cells, positive for CD19, CD20,negative for CD5 and CD10 with no light chain restriction seen.  Labs sent reveal a normal SPEP/LAINE, k/l ration 1.71, B2 microglobulin 1.41, IgG 889, IgM 87,ESR<2.  She underwent a bone marrow biopsy on 9/26 which revealed a normocellular marrow, trilineage hematopoiesis, minimal involvement with CLL (10-15%)- IHC positive CD19, CD20, CD5, CD23.  CLL FISH revealed deletion 13, no evidence of del 11 or 17.  IGVH is mutated, CD38 is not expressed.  For some reason FISH for bcr-abl was sent- negative along with CALR which was also normal.  She had a PET/CT completed on 9/7 that revealed a 5 mm nodule in her LLL, otherwise no lymphadenopathy or splenomegaly.  She denies any previous medical history. \par \par Cholecystectomy in October 2021. \par  [de-identified] : Patient previously followed with Dr. Franco and then Dr. Tolbert, before  most recently seeing Dr. Goldberg. However, patient now wants to transition care to Dr. Noriega. Patient reports that she has been in her usual state of health. She has not had any recent hospitalization, but had recent COVID infection (<2 months ago). She stated that during her COVID infection she had increase in her LN, which have now decreased but not to their baseline.  She denies any recent fevers, chills, night sweats, nausea, vomiting, diarrhea, constipation. She reports that her appetite is good. Her weight is  Slight infrequent bruises but no bleeding. \par \par Never had a mammogram; last pap smear was one year ago. Did not get Flu or any COVID vaccines. \par \par \par

## 2023-01-19 LAB
ALBUMIN SERPL ELPH-MCNC: 4.9 G/DL
ALP BLD-CCNC: 50 U/L
ALT SERPL-CCNC: 35 U/L
ANION GAP SERPL CALC-SCNC: 12 MMOL/L
AST SERPL-CCNC: 23 U/L
BILIRUB SERPL-MCNC: 0.2 MG/DL
BUN SERPL-MCNC: 16 MG/DL
CALCIUM SERPL-MCNC: 9.6 MG/DL
CHLORIDE SERPL-SCNC: 102 MMOL/L
CO2 SERPL-SCNC: 28 MMOL/L
CREAT SERPL-MCNC: 0.74 MG/DL
EGFR: 108 ML/MIN/1.73M2
GLUCOSE SERPL-MCNC: 123 MG/DL
LDH SERPL-CCNC: 145 U/L
MAGNESIUM SERPL-MCNC: 2 MG/DL
PHOSPHATE SERPL-MCNC: 4.2 MG/DL
POTASSIUM SERPL-SCNC: 4 MMOL/L
PROT SERPL-MCNC: 6.7 G/DL
SODIUM SERPL-SCNC: 141 MMOL/L
URATE SERPL-MCNC: 4.9 MG/DL

## 2023-01-21 NOTE — REVIEW OF SYSTEMS
Report received from night [facility]. Pt sitting on stretcher in treatment room, neb treatment in process. No complaints at this time. [Negative] : Heme/Lymph [FreeTextEntry2] : intentional loss

## 2023-02-27 ENCOUNTER — APPOINTMENT (OUTPATIENT)
Dept: OBGYN | Facility: CLINIC | Age: 38
End: 2023-02-27
Payer: MEDICAID

## 2023-02-27 VITALS
HEIGHT: 65 IN | DIASTOLIC BLOOD PRESSURE: 71 MMHG | WEIGHT: 149 LBS | SYSTOLIC BLOOD PRESSURE: 114 MMHG | HEART RATE: 73 BPM | BODY MASS INDEX: 24.83 KG/M2

## 2023-02-27 PROCEDURE — 99395 PREV VISIT EST AGE 18-39: CPT

## 2023-02-28 NOTE — PLAN
[FreeTextEntry1] : 36 y/o P3 presenting for annual exam\par -f/u pap with HPV cotesting per ASCCP guidelines due to HPV+ pap in 2020\par -Contraception - Mirena IUD in place. BSUS performed to confirm placement\par -Discussed scar mobilization due to discomfort around c/s incision\par -f/u PRN\par

## 2023-02-28 NOTE — HISTORY OF PRESENT ILLNESS
[FreeTextEntry1] : Patient is a 38 y/o P3 presenting for an annual visit. LMP 2 weeks ago. She is feeling well and is without complaints. She denies vaginal itching, odor and discharge. Denies urinary urgency, frequency and dysuria.\par \par GYN Hx:\par Prior pap 11/2021 - normal, HPV negative\par History of HPV+ pap smear in 2020\par Mirena IUD in place\par \par \par  [Patient reported PAP Smear was normal] : Patient reported PAP Smear was normal [HIV test declined] : HIV test declined [Syphilis test declined] : Syphilis test declined [Gonorrhea test declined] : Gonorrhea test declined [Chlamydia test declined] : Chlamydia test declined [Trichomonas test declined] : Trichomonas test declined [Hepatitis B test declined] : Hepatitis B test declined [Hepatitis C test declined] : Hepatitis C test declined [N] : Patient reports normal menses [Y] : Positive pregnancy history [PapSmeardate] : 11/2021 [LMPDate] : 2/13/23 [PGHxTotal] : 3 [facility]] : 3

## 2023-02-28 NOTE — PHYSICAL EXAM
[Chaperone Present] : A chaperone was present in the examining room during all aspects of the physical examination [Appropriately responsive] : appropriately responsive [Alert] : alert [No Acute Distress] : no acute distress [Soft] : soft [Non-tender] : non-tender [Non-distended] : non-distended [Oriented x3] : oriented x3 [FreeTextEntry3] : supple [FreeTextEntry7] : Mildly tender over left aspect of c/s incision [Examination Of The Breasts] : a normal appearance [No Masses] : no breast masses were palpable [Labia Majora] : normal [Labia Minora] : normal [Normal] : normal [Uterine Adnexae] : normal [FreeTextEntry5] : IUD strings not visualized

## 2023-03-04 LAB
CYTOLOGY CVX/VAG DOC THIN PREP: NORMAL
HPV HIGH+LOW RISK DNA PNL CVX: NOT DETECTED

## 2023-04-17 NOTE — ASU DISCHARGE PLAN (ADULT/PEDIATRIC) - PHYSICIAN SECTION COMPLETE
Yes Please assist patient in urgent follow up 1-2 weeks with vascular surgery for a saphenous vein clot that approximates the saphenofemoral junction

## 2023-05-05 NOTE — OB PST NOTE - PREOP PAIN SCORE
Problem: Ineffective Coping  Goal: Participates in unit activities  Description: Interventions:  - Provide therapeutic environment   - Provide required programming   - Redirect inappropriate behaviors   Outcome: Not Progressing 0

## 2023-11-09 ENCOUNTER — APPOINTMENT (OUTPATIENT)
Dept: ANTEPARTUM | Facility: CLINIC | Age: 38
End: 2023-11-09

## 2023-11-09 ENCOUNTER — APPOINTMENT (OUTPATIENT)
Dept: OBGYN | Facility: CLINIC | Age: 38
End: 2023-11-09
Payer: MEDICAID

## 2023-11-09 VITALS
SYSTOLIC BLOOD PRESSURE: 120 MMHG | HEIGHT: 65 IN | WEIGHT: 152 LBS | DIASTOLIC BLOOD PRESSURE: 73 MMHG | BODY MASS INDEX: 25.33 KG/M2

## 2023-11-09 PROCEDURE — 99213 OFFICE O/P EST LOW 20 MIN: CPT

## 2024-01-04 ENCOUNTER — APPOINTMENT (OUTPATIENT)
Dept: OBGYN | Facility: CLINIC | Age: 39
End: 2024-01-04
Payer: MEDICAID

## 2024-01-04 ENCOUNTER — APPOINTMENT (OUTPATIENT)
Dept: ANTEPARTUM | Facility: CLINIC | Age: 39
End: 2024-01-04

## 2024-01-04 VITALS
HEIGHT: 65 IN | BODY MASS INDEX: 25.33 KG/M2 | WEIGHT: 152 LBS | SYSTOLIC BLOOD PRESSURE: 135 MMHG | DIASTOLIC BLOOD PRESSURE: 70 MMHG

## 2024-01-04 DIAGNOSIS — R59.9 ENLARGED LYMPH NODES, UNSPECIFIED: ICD-10-CM

## 2024-01-04 DIAGNOSIS — R10.32 LEFT LOWER QUADRANT PAIN: ICD-10-CM

## 2024-01-04 PROCEDURE — 99214 OFFICE O/P EST MOD 30 MIN: CPT

## 2024-01-06 PROBLEM — R59.9 ENLARGED LYMPH NODE: Status: ACTIVE | Noted: 2024-01-06

## 2024-01-06 PROBLEM — R10.32 ABDOMINAL PAIN, LLQ (LEFT LOWER QUADRANT): Status: ACTIVE | Noted: 2024-01-06

## 2024-01-06 NOTE — PHYSICAL EXAM
[Appropriately responsive] : appropriately responsive [Alert] : alert [No Acute Distress] : no acute distress [Soft] : soft [Non-tender] : non-tender [Non-distended] : non-distended [No HSM] : No HSM [No Lesions] : no lesions [No Mass] : no mass [Oriented x3] : oriented x3 [FreeTextEntry7] : mild LLQ  point tenderness to deep palpation. No rebound or guarding. Unable to appreciate a mass.  [Labia Majora] : normal [Labia Minora] : normal [Normal] : normal [Uterine Adnexae] : normal

## 2024-01-06 NOTE — PROCEDURE
[Transvaginal Ultrasound] : transvaginal ultrasound [FreeTextEntry3] : IUD in place. Superficial soft tissue mass in LLQ 4x2cm

## 2024-01-06 NOTE — DISCUSSION/SUMMARY
[FreeTextEntry1] : 39 y/o P3 with h/o CLL presenting for f/u  -Spotting: IUD in place - sono wnl with IUD in place - consider TXA but will discuss with oncology -LLQ pain - normal GYN sono with ?soft tissue mass/LN superficially 2x4cm - reviewed prior PET Scan from 10/2023 showing left external iliac LN - possibly the same node - will also discuss with oncologist to determine if further imaging needed

## 2024-01-06 NOTE — HISTORY OF PRESENT ILLNESS
[FreeTextEntry1] : 39 y/o P3 presenting for follow up. Patient c/o prolonged dark spotting on and off that recently stopped for the past 5 days. Patient also c/o LLQ pain - oncologist advised that patient see GYN.

## 2024-05-30 ENCOUNTER — APPOINTMENT (OUTPATIENT)
Dept: OBGYN | Facility: CLINIC | Age: 39
End: 2024-05-30
Payer: MEDICAID

## 2024-05-30 VITALS
SYSTOLIC BLOOD PRESSURE: 113 MMHG | DIASTOLIC BLOOD PRESSURE: 66 MMHG | WEIGHT: 147 LBS | BODY MASS INDEX: 24.49 KG/M2 | HEIGHT: 65 IN

## 2024-05-30 DIAGNOSIS — N92.1 EXCESSIVE AND FREQUENT MENSTRUATION WITH IRREGULAR CYCLE: ICD-10-CM

## 2024-05-30 DIAGNOSIS — Z01.419 ENCOUNTER FOR GYNECOLOGICAL EXAMINATION (GENERAL) (ROUTINE) W/OUT ABNORMAL FINDINGS: ICD-10-CM

## 2024-05-30 PROCEDURE — 99395 PREV VISIT EST AGE 18-39: CPT

## 2024-05-30 NOTE — HISTORY OF PRESENT ILLNESS
[FreeTextEntry1] : Patient is a 38 year old P3 presenting for an annual visit. LMP 2 weeks ago - still reporting prolonged menses x2 weeks with spotting. She is otherwise feeling well and is without complaints. She denies vaginal itching, odor and discharge. Denies urinary urgency, frequency and dysuria.

## 2024-05-30 NOTE — PHYSICAL EXAM
[Appropriately responsive] : appropriately responsive [Alert] : alert [No Acute Distress] : no acute distress [Soft] : soft [Non-tender] : non-tender [Non-distended] : non-distended [No HSM] : No HSM [No Lesions] : no lesions [No Mass] : no mass [Oriented x3] : oriented x3 [Examination Of The Breasts] : a normal appearance [Breast Palpation Diffuse Fibrous Tissue Bilateral] : fibrocystic changes [No Masses] : no breast masses were palpable [Labia Majora] : normal [Labia Minora] : normal [Normal] : normal [Uterine Adnexae] : normal

## 2024-05-30 NOTE — DISCUSSION/SUMMARY
[FreeTextEntry1] : 38 year old P3 presenting for annual exam -f/u pap and GC/CT -prolonged menses: f/u AUB panel, GYN sono 1/2024 normal. Rx provera to shed lining will start 6/24 5 days before menses due. f/u early august to discuss July menses. If still irregular consider D&C hysteroscopy with  Mirea IUD removal and replacement  -Contraception: Mirena IUD in place -f/u August for tele to review cycle and plan of care

## 2024-05-31 LAB
ALBUMIN SERPL ELPH-MCNC: 4.6 G/DL
ALP BLD-CCNC: 51 U/L
ALT SERPL-CCNC: 44 U/L
ANION GAP SERPL CALC-SCNC: 14 MMOL/L
AST SERPL-CCNC: 34 U/L
BILIRUB SERPL-MCNC: 0.4 MG/DL
BUN SERPL-MCNC: 16 MG/DL
CALCIUM SERPL-MCNC: 9.5 MG/DL
CHLORIDE SERPL-SCNC: 106 MMOL/L
CO2 SERPL-SCNC: 25 MMOL/L
CREAT SERPL-MCNC: 0.7 MG/DL
DHEA-S SERPL-MCNC: 71.3 UG/DL
EGFR: 115 ML/MIN/1.73M2
ESTIMATED AVERAGE GLUCOSE: 114 MG/DL
FSH SERPL-MCNC: 6 IU/L
GLUCOSE SERPL-MCNC: 108 MG/DL
HBA1C MFR BLD HPLC: 5.6 %
LH SERPL-ACNC: 13 IU/L
POTASSIUM SERPL-SCNC: 4.1 MMOL/L
PROLACTIN SERPL-MCNC: 21.9 NG/ML
PROT SERPL-MCNC: 6.7 G/DL
SODIUM SERPL-SCNC: 144 MMOL/L
T4 FREE SERPL-MCNC: 1.3 NG/DL
T4 SERPL-MCNC: 8.3 UG/DL
TSH SERPL-ACNC: 1.73 UIU/ML

## 2024-05-31 RX ORDER — MEDROXYPROGESTERONE ACETATE 5 MG/1
5 TABLET ORAL DAILY
Qty: 5 | Refills: 0 | Status: ACTIVE | COMMUNITY
Start: 2024-05-30 | End: 1900-01-01

## 2024-06-03 LAB
C TRACH RRNA SPEC QL NAA+PROBE: NOT DETECTED
HPV HIGH+LOW RISK DNA PNL CVX: NOT DETECTED
N GONORRHOEA RRNA SPEC QL NAA+PROBE: NOT DETECTED
SOURCE AMPLIFICATION: NORMAL
TESTOST FREE SERPL-MCNC: 0.4 PG/ML
TESTOST SERPL-MCNC: <2.5 NG/DL

## 2024-06-04 LAB — CYTOLOGY CVX/VAG DOC THIN PREP: NORMAL

## 2024-08-01 ENCOUNTER — APPOINTMENT (OUTPATIENT)
Dept: OBGYN | Facility: CLINIC | Age: 39
End: 2024-08-01

## 2024-08-01 DIAGNOSIS — N92.1 EXCESSIVE AND FREQUENT MENSTRUATION WITH IRREGULAR CYCLE: ICD-10-CM

## 2024-08-01 PROCEDURE — 99213 OFFICE O/P EST LOW 20 MIN: CPT

## 2024-08-01 RX ORDER — TRANEXAMIC ACID 650 MG/1
650 TABLET ORAL 3 TIMES DAILY
Qty: 30 | Refills: 2 | Status: ACTIVE | COMMUNITY
Start: 2024-08-01 | End: 1900-01-01

## 2024-08-01 NOTE — DISCUSSION/SUMMARY
[FreeTextEntry1] : 40 y/o F with irregular menses - continued AUB after provera -Will talk to Heme/Onc re TXA to stop bleeding now as well as FE infusion as patient is symptomatic of her anemia -task sent for booking D&C hysteroscopy with removal of Mirena IUD and laparoscopic bilateral salpinectomy

## 2024-08-01 NOTE — HISTORY OF PRESENT ILLNESS
[FreeTextEntry1] : Patient is a 40 y/o P3 presenting for follow up. Patient having irregular bleeding for years with Mirena IUD. Hormonal panel wnl. Patient still with AUB after Provera and reporting episode of heavy vaginal bleeding with clots - causing dizziness and weakness. Saw heme and was told Hgb dropped 2 points.   Patient also inquired about permanent sterilization. The patient was counseled on the option of long-acting reversible contraception but states that she patient desires permanent contraception. The patient was counseled on all methods of sterilization including laparoscopic salpingectomy and vasectomy. She voiced understanding that these methods are not reversible and that she will not be able to become pregnant without ART after the procedure is performed. The patient was counseled on the risk of regret with data from the CREST study indicating that this risk is approximately 20% in women less than age 30, and 6% in women older than 30.

## 2024-08-26 ENCOUNTER — APPOINTMENT (OUTPATIENT)
Dept: OBGYN | Facility: CLINIC | Age: 39
End: 2024-08-26

## 2024-09-26 ENCOUNTER — APPOINTMENT (OUTPATIENT)
Dept: GYNECOLOGIC ONCOLOGY | Facility: CLINIC | Age: 39
End: 2024-09-26

## 2025-02-05 NOTE — REVIEW OF SYSTEMS
Patient remained stable on current dose of metoprolol 100 mg, clonidine 0.2 mg and Losartan Potassium 100 MG,    [Abn Vaginal bleeding] : abnormal vaginal bleeding [Negative] : Heme/Lymph